# Patient Record
Sex: FEMALE | Race: OTHER | Employment: FULL TIME | URBAN - METROPOLITAN AREA
[De-identification: names, ages, dates, MRNs, and addresses within clinical notes are randomized per-mention and may not be internally consistent; named-entity substitution may affect disease eponyms.]

---

## 2018-05-25 ENCOUNTER — OFFICE VISIT (OUTPATIENT)
Dept: INTERNAL MEDICINE CLINIC | Facility: CLINIC | Age: 53
End: 2018-05-25

## 2018-05-25 VITALS
BODY MASS INDEX: 33.23 KG/M2 | TEMPERATURE: 98 F | SYSTOLIC BLOOD PRESSURE: 113 MMHG | HEART RATE: 67 BPM | DIASTOLIC BLOOD PRESSURE: 73 MMHG | HEIGHT: 61 IN | WEIGHT: 176 LBS

## 2018-05-25 DIAGNOSIS — J01.90 ACUTE NON-RECURRENT SINUSITIS, UNSPECIFIED LOCATION: Primary | ICD-10-CM

## 2018-05-25 DIAGNOSIS — J30.89 NON-SEASONAL ALLERGIC RHINITIS DUE TO OTHER ALLERGIC TRIGGER: ICD-10-CM

## 2018-05-25 DIAGNOSIS — Z91.09 ENVIRONMENTAL ALLERGIES: ICD-10-CM

## 2018-05-25 PROCEDURE — 99214 OFFICE O/P EST MOD 30 MIN: CPT | Performed by: INTERNAL MEDICINE

## 2018-05-25 PROCEDURE — 99212 OFFICE O/P EST SF 10 MIN: CPT | Performed by: INTERNAL MEDICINE

## 2018-05-25 RX ORDER — AMOXICILLIN 875 MG/1
875 TABLET, COATED ORAL 2 TIMES DAILY
Qty: 20 TABLET | Refills: 0 | Status: SHIPPED | OUTPATIENT
Start: 2018-05-25 | End: 2018-06-04

## 2018-05-25 RX ORDER — FLUTICASONE PROPIONATE 50 MCG
2 SPRAY, SUSPENSION (ML) NASAL DAILY
Qty: 3 BOTTLE | Refills: 3 | Status: SHIPPED | OUTPATIENT
Start: 2018-05-25 | End: 2018-12-04

## 2018-05-25 RX ORDER — METHYLPREDNISOLONE 4 MG/1
TABLET ORAL
Qty: 1 KIT | Refills: 0 | Status: SHIPPED | OUTPATIENT
Start: 2018-05-25 | End: 2019-02-12 | Stop reason: ALTCHOICE

## 2018-05-25 RX ORDER — ERGOCALCIFEROL (VITAMIN D2) 1250 MCG
50000 CAPSULE ORAL WEEKLY
Qty: 12 CAPSULE | Refills: 0 | Status: SHIPPED | OUTPATIENT
Start: 2018-05-25 | End: 2018-08-23

## 2018-05-25 NOTE — PROGRESS NOTES
HPI:    Patient ID: Trevor Tello is a 47year old female.     HPI    Seasonal allergy  Started Monday night  Sore throat  Chills    Sinus cold  No wheezing  Post nasdal drip    /73 (BP Location: Right arm, Patient Position: Sitting, Cuff Size: adult Oral Tablet Therapy Pack As directed. Disp: 1 kit Rfl: 0   amoxicillin 875 MG Oral Tab Take 1 tablet (875 mg total) by mouth 2 (two) times daily.  Disp: 20 tablet Rfl: 0   ergocalciferol 51661 units Oral Cap Take 1 capsule (50,000 Units total) by mouth once eye exhibits no discharge. No scleral icterus. Neck: Neck supple. No thyromegaly present. Cardiovascular: Normal rate, regular rhythm, S1 normal, S2 normal, normal heart sounds and intact distal pulses. Exam reveals no gallop. No murmur heard.   Pul

## 2018-12-04 ENCOUNTER — OFFICE VISIT (OUTPATIENT)
Dept: INTERNAL MEDICINE CLINIC | Facility: CLINIC | Age: 53
End: 2018-12-04

## 2018-12-04 VITALS
HEART RATE: 76 BPM | SYSTOLIC BLOOD PRESSURE: 131 MMHG | BODY MASS INDEX: 33.13 KG/M2 | HEIGHT: 62 IN | WEIGHT: 180 LBS | DIASTOLIC BLOOD PRESSURE: 85 MMHG | TEMPERATURE: 98 F

## 2018-12-04 DIAGNOSIS — R05.9 COUGH: Primary | ICD-10-CM

## 2018-12-04 DIAGNOSIS — J06.9 UPPER RESPIRATORY INFECTION, VIRAL: ICD-10-CM

## 2018-12-04 PROCEDURE — 99212 OFFICE O/P EST SF 10 MIN: CPT | Performed by: INTERNAL MEDICINE

## 2018-12-04 PROCEDURE — 99213 OFFICE O/P EST LOW 20 MIN: CPT | Performed by: INTERNAL MEDICINE

## 2018-12-04 RX ORDER — CODEINE PHOSPHATE AND GUAIFENESIN 10; 100 MG/5ML; MG/5ML
5 SOLUTION ORAL EVERY 6 HOURS PRN
Qty: 120 ML | Refills: 0 | Status: SHIPPED | OUTPATIENT
Start: 2018-12-04 | End: 2019-02-12 | Stop reason: ALTCHOICE

## 2018-12-04 RX ORDER — FLUTICASONE PROPIONATE 50 MCG
2 SPRAY, SUSPENSION (ML) NASAL DAILY
Qty: 1 BOTTLE | Refills: 3 | Status: SHIPPED | OUTPATIENT
Start: 2018-12-04 | End: 2019-02-12 | Stop reason: ALTCHOICE

## 2018-12-04 NOTE — PATIENT INSTRUCTIONS
Preventing Common Respiratory Infections  Respiratory infections such as colds and influenza (“the flu”) are common in winter. These infections are often caused by viruses. They may share some symptoms, but not all respiratory infections are the same.  So A flu vaccine protects you from influenza (but not other colds or infections). Get a vaccine each fall, before flu season starts. This can be done at a clinic, healthcare provider’s office, drugstore, senior center, or through your workplace.   Get pneumoco You have a viral upper respiratory illness (URI), which is another term for the common cold. This illness is contagious during the first few days. It is spread through the air by coughing and sneezing.  It may also be spread by direct contact (touching the · Cough with lots of colored sputum (mucus)  · Severe headache; face, neck, or ear pain  · Difficulty swallowing due to throat pain  · Fever of 100.4°F (38°C) or higher, or as directed by your healthcare provider  Call 911  Call 911 if any of these occur: · You may use acetaminophen or ibuprofen to control pain and fever, unless another medicine was prescribed. If you have chronic liver or kidney disease, have ever had a stomach ulcer or gastrointestinal bleeding, or are taking blood-thinning medicines, kaley

## 2018-12-04 NOTE — PROGRESS NOTES
HPI:    Patient ID: Vladimir Lugo is a 47year old female. Thanksgiving around her sister who was sick. Last Tuesday 11/27/18, sore throat x3 days followed by general malaise, body aches, congestion, nasal drip.   Last 3 days severe cough, copious mucou 01/25/2021    Labs reviewed. Imaging Results last 14 days( if any):         Review of Systems:     Constitutional: Negative for chills and fever. HENT: Positive for congestion, postnasal drip and sinus pressure.  Negative for ear pain, rhinorrhea and exhibits no distension. There is no tenderness. Lymphadenopathy:        Head (right side): No submental, no submandibular, no preauricular, no posterior auricular and no occipital adenopathy present.         Head (left side): No submental, no submandibula improve or worsen.      Eddi Conway MD

## 2019-02-12 ENCOUNTER — OFFICE VISIT (OUTPATIENT)
Dept: INTERNAL MEDICINE CLINIC | Facility: CLINIC | Age: 54
End: 2019-02-12

## 2019-02-12 VITALS
TEMPERATURE: 98 F | DIASTOLIC BLOOD PRESSURE: 74 MMHG | HEART RATE: 75 BPM | SYSTOLIC BLOOD PRESSURE: 122 MMHG | BODY MASS INDEX: 34.04 KG/M2 | WEIGHT: 185 LBS | HEIGHT: 62 IN

## 2019-02-12 DIAGNOSIS — M25.511 ACUTE PAIN OF RIGHT SHOULDER: Primary | ICD-10-CM

## 2019-02-12 PROCEDURE — 99212 OFFICE O/P EST SF 10 MIN: CPT | Performed by: INTERNAL MEDICINE

## 2019-02-12 PROCEDURE — 99213 OFFICE O/P EST LOW 20 MIN: CPT | Performed by: INTERNAL MEDICINE

## 2019-02-12 RX ORDER — METHYLPREDNISOLONE 4 MG/1
TABLET ORAL
Qty: 1 KIT | Refills: 0 | Status: SHIPPED | OUTPATIENT
Start: 2019-02-12 | End: 2019-05-06

## 2019-02-12 NOTE — PROGRESS NOTES
Patient ID: Brooks Mcwilliams is a 46year old female. Patient presents with:  Shoulder Pain: extreme right shoulder pain, started last night. HISTORY OF PRESENT ILLNESS:   HPI  Patient resents for above.   Here with a 24 history of extreme right shou insecurity - worry: Not on file      Food insecurity - inability: Not on file      Transportation needs - medical: Not on file      Transportation needs - non-medical: Not on file    Occupational History      Not on file    Tobacco Use      Smoking status: Therapy Pack; As directed. Dispense: 1 kit; Refill: 0  · ORTHOPEDIC - INTERNAL    Return in about 1 week (around 2/19/2019).     Tilford Boas, MD  2/12/2019

## 2019-02-13 ENCOUNTER — HOSPITAL ENCOUNTER (OUTPATIENT)
Dept: GENERAL RADIOLOGY | Facility: HOSPITAL | Age: 54
Discharge: HOME | End: 2019-02-13
Attending: INTERNAL MEDICINE
Payer: COMMERCIAL

## 2019-02-13 DIAGNOSIS — M25.511 ACUTE PAIN OF RIGHT SHOULDER: ICD-10-CM

## 2019-02-13 PROCEDURE — 72050 X-RAY EXAM NECK SPINE 4/5VWS: CPT | Performed by: INTERNAL MEDICINE

## 2019-02-13 PROCEDURE — 73030 X-RAY EXAM OF SHOULDER: CPT | Performed by: INTERNAL MEDICINE

## 2019-02-15 ENCOUNTER — TELEPHONE (OUTPATIENT)
Dept: OTHER | Age: 54
End: 2019-02-15

## 2019-02-15 NOTE — TELEPHONE ENCOUNTER
Verified name and .   Results/recommendations reviewed with pt and pt verb understanding      [id number]    Result Notes for XR CERVICAL SPINE (4VIEWS) (CPT=72050)     Notes recorded by Moira Preciado RN on 2/15/2019 at 11:30 AM [address] [place] [address] to triage

## 2019-02-18 ENCOUNTER — TELEPHONE (OUTPATIENT)
Dept: INTERNAL MEDICINE CLINIC | Facility: CLINIC | Age: 54
End: 2019-02-18

## 2019-02-20 ENCOUNTER — OFFICE VISIT (OUTPATIENT)
Dept: ORTHOPEDICS CLINIC | Facility: CLINIC | Age: 54
End: 2019-02-20

## 2019-02-20 DIAGNOSIS — M75.01 ADHESIVE CAPSULITIS OF RIGHT SHOULDER: Primary | ICD-10-CM

## 2019-02-20 PROCEDURE — 99212 OFFICE O/P EST SF 10 MIN: CPT | Performed by: ORTHOPAEDIC SURGERY

## 2019-02-20 PROCEDURE — 99243 OFF/OP CNSLTJ NEW/EST LOW 30: CPT | Performed by: ORTHOPAEDIC SURGERY

## 2019-02-20 RX ORDER — MELOXICAM 15 MG/1
15 TABLET ORAL DAILY
Qty: 30 TABLET | Refills: 1 | Status: SHIPPED | OUTPATIENT
Start: 2019-02-20 | End: 2019-05-06

## 2019-02-20 NOTE — H&P
Chief Complaint: right shoulder pain    NURSING INTAKE COMMENTS: Patient presents with:  Consult: Right shoulder pain-  pt states pain started last wk. pt denies any injury. pt saw pcp and had XRs and was given medrol pack and it helped.  pain radiates down WNL WNL   Atrophy None None   Bruising None None   Strength        Supraspinatus 5/5 5/5      Infraspinatus 5/5 5/5      Teres Minor 5/5 5/5      Subscapularis 5/5 5/5      Deltoid 5/5 5/5      Biceps 5/5 5/5        Pain         Rotator Cuff resistance non

## 2019-05-06 ENCOUNTER — OFFICE VISIT (OUTPATIENT)
Dept: INTERNAL MEDICINE CLINIC | Facility: CLINIC | Age: 54
End: 2019-05-06

## 2019-05-06 VITALS
TEMPERATURE: 98 F | HEART RATE: 64 BPM | SYSTOLIC BLOOD PRESSURE: 130 MMHG | WEIGHT: 179.38 LBS | DIASTOLIC BLOOD PRESSURE: 83 MMHG | OXYGEN SATURATION: 96 % | BODY MASS INDEX: 33 KG/M2

## 2019-05-06 DIAGNOSIS — R05.9 COUGH: Primary | ICD-10-CM

## 2019-05-06 PROCEDURE — 99213 OFFICE O/P EST LOW 20 MIN: CPT | Performed by: PHYSICIAN ASSISTANT

## 2019-05-06 PROCEDURE — 99212 OFFICE O/P EST SF 10 MIN: CPT | Performed by: PHYSICIAN ASSISTANT

## 2019-05-06 RX ORDER — LEVOCETIRIZINE DIHYDROCHLORIDE 5 MG/1
5 TABLET, FILM COATED ORAL EVERY EVENING
Qty: 30 TABLET | Refills: 2 | Status: SHIPPED | OUTPATIENT
Start: 2019-05-06 | End: 2020-03-12

## 2019-05-06 NOTE — PROGRESS NOTES
HPI:    Patient ID: Navi Andrews is a 46year old female. HPI   Patient presents complaining of a nonproductive cough since last Tuesday. She also complains of congestion, watery eyes and a self-reported intermittent fever.   Patient has taken over-th per NG   • Lung Disorder Mother         Sarcoidosis; per NG   • Asthma Son         per NG   • Allergies Other         Family H/o; per NG   • Glaucoma Neg    • Macular degeneration Neg          PHYSICAL EXAM:   /83 (BP Location: Right arm, Patient Pos Inhalation Aerosol Powder, Breath Activated 1 each 1     Sig: Inhale 2 puffs into the lungs every 4 (four) hours as needed. • Levocetirizine Dihydrochloride 5 MG Oral Tab 30 tablet 2     Sig: Take 1 tablet (5 mg total) by mouth every evening.        Iza Lawton

## 2019-05-14 ENCOUNTER — TELEPHONE (OUTPATIENT)
Dept: CASE MANAGEMENT | Age: 54
End: 2019-05-14

## 2020-03-12 ENCOUNTER — LAB ENCOUNTER (OUTPATIENT)
Dept: LAB | Age: 55
End: 2020-03-12
Attending: INTERNAL MEDICINE
Payer: COMMERCIAL

## 2020-03-12 ENCOUNTER — TELEPHONE (OUTPATIENT)
Dept: OPHTHALMOLOGY | Facility: CLINIC | Age: 55
End: 2020-03-12

## 2020-03-12 ENCOUNTER — OFFICE VISIT (OUTPATIENT)
Dept: INTERNAL MEDICINE CLINIC | Facility: CLINIC | Age: 55
End: 2020-03-12

## 2020-03-12 VITALS
RESPIRATION RATE: 16 BRPM | HEIGHT: 62 IN | BODY MASS INDEX: 34.78 KG/M2 | HEART RATE: 67 BPM | WEIGHT: 189 LBS | SYSTOLIC BLOOD PRESSURE: 120 MMHG | DIASTOLIC BLOOD PRESSURE: 79 MMHG

## 2020-03-12 DIAGNOSIS — Z12.31 SCREENING MAMMOGRAM, ENCOUNTER FOR: ICD-10-CM

## 2020-03-12 DIAGNOSIS — H43.393 VITREOUS FLOATERS OF BOTH EYES: ICD-10-CM

## 2020-03-12 DIAGNOSIS — E55.9 VITAMIN D DEFICIENCY: ICD-10-CM

## 2020-03-12 DIAGNOSIS — H43.813 VITREOUS DETACHMENT, BILATERAL: ICD-10-CM

## 2020-03-12 DIAGNOSIS — Z00.00 PHYSICAL EXAM: ICD-10-CM

## 2020-03-12 DIAGNOSIS — Z00.00 PHYSICAL EXAM: Primary | ICD-10-CM

## 2020-03-12 DIAGNOSIS — Z01.419 ENCOUNTER FOR ROUTINE GYNECOLOGICAL EXAMINATION WITH PAPANICOLAOU SMEAR OF CERVIX: ICD-10-CM

## 2020-03-12 LAB
ALBUMIN SERPL-MCNC: 4.1 G/DL (ref 3.4–5)
ALBUMIN/GLOB SERPL: 1.1 {RATIO} (ref 1–2)
ALP LIVER SERPL-CCNC: 97 U/L (ref 41–108)
ALT SERPL-CCNC: 25 U/L (ref 13–56)
ANION GAP SERPL CALC-SCNC: 6 MMOL/L (ref 0–18)
AST SERPL-CCNC: 16 U/L (ref 15–37)
BASOPHILS # BLD AUTO: 0.05 X10(3) UL (ref 0–0.2)
BASOPHILS NFR BLD AUTO: 1 %
BILIRUB SERPL-MCNC: 0.4 MG/DL (ref 0.1–2)
BUN BLD-MCNC: 13 MG/DL (ref 7–18)
BUN/CREAT SERPL: 20 (ref 10–20)
CALCIUM BLD-MCNC: 9.5 MG/DL (ref 8.5–10.1)
CHLORIDE SERPL-SCNC: 106 MMOL/L (ref 98–112)
CHOLEST SMN-MCNC: 205 MG/DL (ref ?–200)
CO2 SERPL-SCNC: 28 MMOL/L (ref 21–32)
CREAT BLD-MCNC: 0.65 MG/DL (ref 0.55–1.02)
DEPRECATED RDW RBC AUTO: 46.4 FL (ref 35.1–46.3)
EOSINOPHIL # BLD AUTO: 0.07 X10(3) UL (ref 0–0.7)
EOSINOPHIL NFR BLD AUTO: 1.3 %
ERYTHROCYTE [DISTWIDTH] IN BLOOD BY AUTOMATED COUNT: 13.1 % (ref 11–15)
GLOBULIN PLAS-MCNC: 3.8 G/DL (ref 2.8–4.4)
GLUCOSE BLD-MCNC: 103 MG/DL (ref 70–99)
HCT VFR BLD AUTO: 40.6 % (ref 35–48)
HDLC SERPL-MCNC: 57 MG/DL (ref 40–59)
HGB BLD-MCNC: 13.3 G/DL (ref 12–16)
IMM GRANULOCYTES # BLD AUTO: 0.02 X10(3) UL (ref 0–1)
IMM GRANULOCYTES NFR BLD: 0.4 %
LDLC SERPL CALC-MCNC: 126 MG/DL (ref ?–100)
LYMPHOCYTES # BLD AUTO: 1.38 X10(3) UL (ref 1–4)
LYMPHOCYTES NFR BLD AUTO: 26.5 %
M PROTEIN MFR SERPL ELPH: 7.9 G/DL (ref 6.4–8.2)
MCH RBC QN AUTO: 32 PG (ref 26–34)
MCHC RBC AUTO-ENTMCNC: 32.8 G/DL (ref 31–37)
MCV RBC AUTO: 97.8 FL (ref 80–100)
MONOCYTES # BLD AUTO: 0.45 X10(3) UL (ref 0.1–1)
MONOCYTES NFR BLD AUTO: 8.6 %
NEUTROPHILS # BLD AUTO: 3.24 X10 (3) UL (ref 1.5–7.7)
NEUTROPHILS # BLD AUTO: 3.24 X10(3) UL (ref 1.5–7.7)
NEUTROPHILS NFR BLD AUTO: 62.2 %
NONHDLC SERPL-MCNC: 148 MG/DL (ref ?–130)
OSMOLALITY SERPL CALC.SUM OF ELEC: 290 MOSM/KG (ref 275–295)
PATIENT FASTING Y/N/NP: YES
PATIENT FASTING Y/N/NP: YES
PLATELET # BLD AUTO: 242 10(3)UL (ref 150–450)
POTASSIUM SERPL-SCNC: 4.3 MMOL/L (ref 3.5–5.1)
RBC # BLD AUTO: 4.15 X10(6)UL (ref 3.8–5.3)
SODIUM SERPL-SCNC: 140 MMOL/L (ref 136–145)
TRIGL SERPL-MCNC: 108 MG/DL (ref 30–149)
TSI SER-ACNC: 1.69 MIU/ML (ref 0.36–3.74)
VLDLC SERPL CALC-MCNC: 22 MG/DL (ref 0–30)
WBC # BLD AUTO: 5.2 X10(3) UL (ref 4–11)

## 2020-03-12 PROCEDURE — 82306 VITAMIN D 25 HYDROXY: CPT

## 2020-03-12 PROCEDURE — 80053 COMPREHEN METABOLIC PANEL: CPT

## 2020-03-12 PROCEDURE — 80061 LIPID PANEL: CPT

## 2020-03-12 PROCEDURE — 85025 COMPLETE CBC W/AUTO DIFF WBC: CPT

## 2020-03-12 PROCEDURE — 99396 PREV VISIT EST AGE 40-64: CPT | Performed by: INTERNAL MEDICINE

## 2020-03-12 PROCEDURE — 36415 COLL VENOUS BLD VENIPUNCTURE: CPT

## 2020-03-12 PROCEDURE — 84443 ASSAY THYROID STIM HORMONE: CPT

## 2020-03-12 NOTE — PROGRESS NOTES
Dennys Rasmussen is a 53year old female.   Patient presents with:  Physical: NO flu shot  Eye Problem: Left eye   Pap      HPI:   Patient comes for physical  C/C physical  C/o complains of left eye pain one week-- has had floaters chronic but gets flaoters o apparent distress  SKIN: no rashes,no suspicious lesions  HEENT: atraumatic, normocephalic,ears and throat are clear, no frontal or maxillary sinus tenderness, pupils equal reactive to light bilaterally, extraocular muscles intact  NECK: supple,no adenopat 2015–normal  Colonoscopy–at the age of 47, rpt in 11 yrs -- ie 2021   Mammogram–2015  Labs to be done today as she is fasting  Declines flu shot           The patient indicates understanding of these issues and agrees to the plan. No follow-ups on file.

## 2020-03-12 NOTE — TELEPHONE ENCOUNTER
Pt states floaters are getting larger  and its obstructing her vision referred by PCP  please advise

## 2020-03-12 NOTE — TELEPHONE ENCOUNTER
Pt complains of floaters in both eyes and one episode of eye pain left eye last week. No flashing lights, no vision loss. NP/ I gave her information to see [facility].

## 2020-03-13 LAB — 25(OH)D3 SERPL-MCNC: 13.4 NG/ML (ref 30–100)

## 2020-03-15 RX ORDER — ERGOCALCIFEROL 1.25 MG/1
50000 CAPSULE ORAL WEEKLY
Qty: 4 CAPSULE | Refills: 3 | Status: SHIPPED | OUTPATIENT
Start: 2020-03-15

## 2020-03-18 ENCOUNTER — TELEPHONE (OUTPATIENT)
Dept: INTERNAL MEDICINE CLINIC | Facility: CLINIC | Age: 55
End: 2020-03-18

## 2020-03-18 NOTE — TELEPHONE ENCOUNTER
Patient requesting to have referral for Dr. Dipesh Odell (Ophthalmologist) faxed to his office at:  [phone]. Please fax.

## 2020-08-17 ENCOUNTER — TELEMEDICINE (OUTPATIENT)
Dept: INTERNAL MEDICINE CLINIC | Facility: CLINIC | Age: 55
End: 2020-08-17

## 2020-08-17 ENCOUNTER — NURSE TRIAGE (OUTPATIENT)
Dept: INTERNAL MEDICINE CLINIC | Facility: CLINIC | Age: 55
End: 2020-08-17

## 2020-08-17 DIAGNOSIS — S93.401A SPRAIN OF RIGHT ANKLE, UNSPECIFIED LIGAMENT, INITIAL ENCOUNTER: Primary | ICD-10-CM

## 2020-08-17 PROCEDURE — 99213 OFFICE O/P EST LOW 20 MIN: CPT | Performed by: INTERNAL MEDICINE

## 2020-08-17 NOTE — PROGRESS NOTES
Patient ID: Jorge Barbosa is a 53year old female. Patient presents with: Foot Pain         HISTORY OF PRESENT ILLNESS:   Patient presents for above. This visit is conducted using Telemedicine with live, interactive video and audio.   C/c sprained her a History      Not on file    Social Needs      Financial resource strain: Not on file      Food insecurity:        Worry: Not on file        Inability: Not on file      Transportation needs:        Medical: Not on file        Non-medical: Not on file    Tob distress, no coughing  She did show the right ankle and it is swollen but without any redness. ASSESSMENT/PLAN:   1. Sprain of right ankle, unspecified ligament, initial encounter  - XR ANKLE (MIN 3 VIEWS), RIGHT (CPT=73610);  Future  Advised to ice the the patient confirmed that [place] were in [place]. Included in this visit, time may have been spent reviewing labs, medications, radiology tests and decision making.  Appropriate medical decision-making and tests are ordered as detailed in the plan of care

## 2020-08-17 NOTE — TELEPHONE ENCOUNTER
Action Requested: Summary for Provider     []  Critical Lab, Recommendations Needed  [] Need Additional Advice  []   FYI    []   Need Orders  [] Need Medications Sent to Pharmacy  []  Other     SUMMARY:    Virtual appointment made for today 8/17/20 -  [place]

## 2020-08-18 ENCOUNTER — HOSPITAL ENCOUNTER (OUTPATIENT)
Dept: GENERAL RADIOLOGY | Facility: HOSPITAL | Age: 55
Discharge: HOME | End: 2020-08-18
Attending: INTERNAL MEDICINE
Payer: COMMERCIAL

## 2020-08-18 DIAGNOSIS — S93.401A SPRAIN OF RIGHT ANKLE, UNSPECIFIED LIGAMENT, INITIAL ENCOUNTER: ICD-10-CM

## 2020-08-18 PROCEDURE — 73610 X-RAY EXAM OF ANKLE: CPT | Performed by: INTERNAL MEDICINE

## 2020-08-19 DIAGNOSIS — S82.891A CLOSED FRACTURE OF RIGHT ANKLE, INITIAL ENCOUNTER: Primary | ICD-10-CM

## 2020-08-24 ENCOUNTER — OFFICE VISIT (OUTPATIENT)
Dept: ORTHOPEDICS CLINIC | Facility: CLINIC | Age: 55
End: 2020-08-24

## 2020-08-24 VITALS — BODY MASS INDEX: 34.78 KG/M2 | HEIGHT: 62 IN | WEIGHT: 189 LBS

## 2020-08-24 DIAGNOSIS — S92.251A CLOSED AVULSION FRACTURE OF NAVICULAR BONE OF RIGHT FOOT, INITIAL ENCOUNTER: ICD-10-CM

## 2020-08-24 DIAGNOSIS — S92.154A CLOSED NONDISPLACED AVULSION FRACTURE OF RIGHT TALUS, INITIAL ENCOUNTER: Primary | ICD-10-CM

## 2020-08-24 PROCEDURE — 28430 CLTX TALUS FRACTURE W/O MNPJ: CPT | Performed by: ORTHOPAEDIC SURGERY

## 2020-08-24 PROCEDURE — 3008F BODY MASS INDEX DOCD: CPT | Performed by: ORTHOPAEDIC SURGERY

## 2020-08-24 PROCEDURE — 99243 OFF/OP CNSLTJ NEW/EST LOW 30: CPT | Performed by: ORTHOPAEDIC SURGERY

## 2020-08-24 RX ORDER — LEVOCETIRIZINE DIHYDROCHLORIDE 5 MG/1
5 TABLET, FILM COATED ORAL EVERY EVENING
COMMUNITY
Start: 2020-05-27 | End: 2020-10-17

## 2020-08-24 NOTE — H&P
NURSING INTAKE COMMENTS: Patient presents with:  Consult: right ankle injury -- Xrays taken 08/18/20. Onset 08/15/20 from falling down stairs  at home. Rates pain 1-2/10. No pain meds taken now.        HPI: This 53year old female presents today with compla Social History    Occupational History      Not on file    Tobacco Use      Smoking status: Never Smoker      Smokeless tobacco: Never Used    Substance and Sexual Activity      Alcohol use: No        Alcohol/week: 0.0 standard drinks      Drug use: 8/18/2020  PROCEDURE: XR ANKLE (MIN 3 VIEWS), RIGHT (CPT=73610)  COMPARISON: None. INDICATIONS: Pain in right lateral mallelous post fall 8-. TECHNIQUE: Three views were obtained.    FINDINGS:  BONES: Mildly displaced avulsion fractures of the dors any typos.     Natividad Diaz MD

## 2020-10-16 NOTE — TELEPHONE ENCOUNTER
Patient is requesting a refill for levocetirizine dihydrochloride. Please advise.      Levocetirizine Dihydrochloride 5 MG Oral Tab

## 2020-10-17 RX ORDER — LEVOCETIRIZINE DIHYDROCHLORIDE 5 MG/1
5 TABLET, FILM COATED ORAL EVERY EVENING
Qty: 90 TABLET | Refills: 1 | Status: SHIPPED | OUTPATIENT
Start: 2020-10-17

## 2020-12-29 ENCOUNTER — TELEPHONE (OUTPATIENT)
Dept: GASTROENTEROLOGY | Facility: CLINIC | Age: 55
End: 2020-12-29

## 2020-12-29 NOTE — TELEPHONE ENCOUNTER
----- Message from Corby Keating RN sent at 1/29/2016  8:06 AM CST -----  Regarding: colon recall  Repeat colon in 5 years per Dr Jessie Ray

## 2021-06-02 ENCOUNTER — TELEPHONE (OUTPATIENT)
Dept: INTERNAL MEDICINE CLINIC | Facility: CLINIC | Age: 56
End: 2021-06-02

## 2021-06-02 DIAGNOSIS — Z12.31 SCREENING MAMMOGRAM, ENCOUNTER FOR: Primary | ICD-10-CM

## 2021-06-03 NOTE — TELEPHONE ENCOUNTER
Ordered mammogram but please call patient and let her know that she is due for an appointment is we need to discuss blood work and her elevated fasting blood sugars–please ask her to make an appointment

## 2021-06-03 NOTE — TELEPHONE ENCOUNTER
The patient was called and informed. She stated she cannot make the appointment now for she is at work. She will call back to schedule the appointment.

## 2022-05-11 ENCOUNTER — TELEPHONE (OUTPATIENT)
Dept: CASE MANAGEMENT | Age: 57
End: 2022-05-11

## 2023-04-03 ENCOUNTER — OFFICE VISIT (OUTPATIENT)
Dept: INTERNAL MEDICINE CLINIC | Facility: CLINIC | Age: 58
End: 2023-04-03

## 2023-04-03 VITALS
OXYGEN SATURATION: 98 % | RESPIRATION RATE: 16 BRPM | SYSTOLIC BLOOD PRESSURE: 130 MMHG | DIASTOLIC BLOOD PRESSURE: 83 MMHG | BODY MASS INDEX: 34.37 KG/M2 | WEIGHT: 194 LBS | HEART RATE: 92 BPM | TEMPERATURE: 98 F | HEIGHT: 63 IN

## 2023-04-03 DIAGNOSIS — J06.9 URI WITH COUGH AND CONGESTION: Primary | ICD-10-CM

## 2023-04-03 PROCEDURE — 3079F DIAST BP 80-89 MM HG: CPT | Performed by: NURSE PRACTITIONER

## 2023-04-03 PROCEDURE — 99213 OFFICE O/P EST LOW 20 MIN: CPT | Performed by: NURSE PRACTITIONER

## 2023-04-03 PROCEDURE — 3008F BODY MASS INDEX DOCD: CPT | Performed by: NURSE PRACTITIONER

## 2023-04-03 PROCEDURE — 3075F SYST BP GE 130 - 139MM HG: CPT | Performed by: NURSE PRACTITIONER

## 2023-04-03 RX ORDER — CODEINE PHOSPHATE AND GUAIFENESIN 10; 100 MG/5ML; MG/5ML
5 SOLUTION ORAL EVERY 6 HOURS PRN
Qty: 118 ML | Refills: 0 | Status: SHIPPED | OUTPATIENT
Start: 2023-04-03

## 2023-04-03 NOTE — PATIENT INSTRUCTIONS
I will follow up with your regarding your test results     Continue theraflu as needed for cough and congestion    You can take tylenol or ibuprofen as needed for sore throat and body aches.

## 2023-04-04 ENCOUNTER — TELEPHONE (OUTPATIENT)
Dept: INTERNAL MEDICINE CLINIC | Facility: CLINIC | Age: 58
End: 2023-04-04

## 2023-04-04 ENCOUNTER — HOSPITAL ENCOUNTER (OUTPATIENT)
Dept: GENERAL RADIOLOGY | Facility: HOSPITAL | Age: 58
Discharge: HOME | End: 2023-04-04
Attending: NURSE PRACTITIONER
Payer: COMMERCIAL

## 2023-04-04 DIAGNOSIS — J06.9 URI WITH COUGH AND CONGESTION: ICD-10-CM

## 2023-04-04 LAB
FLUAV + FLUBV RNA SPEC NAA+PROBE: NOT DETECTED
FLUAV + FLUBV RNA SPEC NAA+PROBE: NOT DETECTED
RSV RNA SPEC NAA+PROBE: NOT DETECTED
SARS-COV-2 RNA RESP QL NAA+PROBE: NOT DETECTED

## 2023-04-04 PROCEDURE — 71046 X-RAY EXAM CHEST 2 VIEWS: CPT | Performed by: NURSE PRACTITIONER

## 2023-04-11 DIAGNOSIS — J06.9 URI WITH COUGH AND CONGESTION: ICD-10-CM

## 2023-04-11 RX ORDER — CODEINE PHOSPHATE AND GUAIFENESIN 10; 100 MG/5ML; MG/5ML
5 SOLUTION ORAL EVERY 6 HOURS PRN
Qty: 118 ML | Refills: 0 | Status: SHIPPED | OUTPATIENT
Start: 2023-04-11

## 2023-04-11 RX ORDER — METHYLPREDNISOLONE 4 MG/1
TABLET ORAL
Qty: 21 TABLET | Refills: 0 | Status: SHIPPED | OUTPATIENT
Start: 2023-04-11

## 2023-04-11 RX ORDER — FLUTICASONE PROPIONATE 50 MCG
2 SPRAY, SUSPENSION (ML) NASAL DAILY
Qty: 16 G | Refills: 0 | Status: SHIPPED | OUTPATIENT
Start: 2023-04-11 | End: 2024-04-05

## 2023-04-11 NOTE — TELEPHONE ENCOUNTER
Per patient cough mostly at night but sometimes when she starts talking a lot she will have a coughing episode. No other symptoms present currently. Patient states she has had improvement but in the middle of the night she will start coughing excessively and it wakes her up. Denies any wheezing or shortness of breath but patient states she does feel congested. She would also like to know if she can have flonase?

## 2023-04-11 NOTE — TELEPHONE ENCOUNTER
Please contact patient she is only coughing at night time? Is she having any wheezing or SOB. Are her symptoms improving?

## 2023-04-11 NOTE — TELEPHONE ENCOUNTER
Please review. Protocol failed / No protocol. Requested Prescriptions   Pending Prescriptions Disp Refills    guaiFENesin-codeine (CHERATUSSIN AC) 100-10 MG/5ML Oral Solution 118 mL 0     Sig: Take 5 mL by mouth every 6 (six) hours as needed for cough.        There is no refill protocol information for this order              Recent Outpatient Visits              1 week ago URI with cough and congestion    [address] [facility], [facility]    Office Visit    2 years ago Closed nondisplaced avulsion fracture of right talus, initial encounter    [address] [facility], Nelda King MD    Office Visit    2 years ago Sprain of right ankle, unspecified ligament, initial encounter    [address] [facility], Magdiel Soria MD    Telemedicine    3 years ago Physical exam    Vivien Grijalva MD    Office Visit    3 years ago Cough    [facility], [address] [facility]    Office Visit

## 2023-04-20 ENCOUNTER — OFFICE VISIT (OUTPATIENT)
Dept: INTERNAL MEDICINE CLINIC | Facility: CLINIC | Age: 58
End: 2023-04-20

## 2023-04-20 VITALS
BODY MASS INDEX: 34.37 KG/M2 | HEIGHT: 63 IN | WEIGHT: 194 LBS | DIASTOLIC BLOOD PRESSURE: 83 MMHG | SYSTOLIC BLOOD PRESSURE: 129 MMHG | HEART RATE: 73 BPM | RESPIRATION RATE: 16 BRPM

## 2023-04-20 DIAGNOSIS — J02.9 SORE THROAT: Primary | ICD-10-CM

## 2023-04-20 LAB
CONTROL LINE PRESENT WITH A CLEAR BACKGROUND (YES/NO): YES YES/NO
KIT LOT #: NORMAL NUMERIC
STREP GRP A CUL-SCR: NEGATIVE

## 2023-04-20 PROCEDURE — 3074F SYST BP LT 130 MM HG: CPT | Performed by: NURSE PRACTITIONER

## 2023-04-20 PROCEDURE — 87880 STREP A ASSAY W/OPTIC: CPT | Performed by: NURSE PRACTITIONER

## 2023-04-20 PROCEDURE — 3008F BODY MASS INDEX DOCD: CPT | Performed by: NURSE PRACTITIONER

## 2023-04-20 PROCEDURE — 99213 OFFICE O/P EST LOW 20 MIN: CPT | Performed by: NURSE PRACTITIONER

## 2023-04-20 PROCEDURE — 3079F DIAST BP 80-89 MM HG: CPT | Performed by: NURSE PRACTITIONER

## 2023-04-20 RX ORDER — LIDOCAINE HYDROCHLORIDE 20 MG/ML
5 SOLUTION OROPHARYNGEAL
Qty: 100 ML | Refills: 0 | Status: SHIPPED | OUTPATIENT
Start: 2023-04-20

## 2023-04-20 NOTE — PATIENT INSTRUCTIONS
Take ibuprofen as needed for throat pain    Take an over the counter decongestant for ear pressure and throat discomfort. You can take zyrtec-D or sudafed. Continue flonase nasal spray daily. Make a follow up appt with ENT.

## 2023-09-18 ENCOUNTER — LAB ENCOUNTER (OUTPATIENT)
Dept: LAB | Age: 58
End: 2023-09-18
Attending: NURSE PRACTITIONER
Payer: COMMERCIAL

## 2023-09-18 ENCOUNTER — OFFICE VISIT (OUTPATIENT)
Dept: INTERNAL MEDICINE CLINIC | Facility: CLINIC | Age: 58
End: 2023-09-18

## 2023-09-18 VITALS
RESPIRATION RATE: 16 BRPM | DIASTOLIC BLOOD PRESSURE: 84 MMHG | HEIGHT: 63 IN | HEART RATE: 61 BPM | BODY MASS INDEX: 33.66 KG/M2 | WEIGHT: 190 LBS | SYSTOLIC BLOOD PRESSURE: 133 MMHG

## 2023-09-18 DIAGNOSIS — Z00.00 ANNUAL PHYSICAL EXAM: Primary | ICD-10-CM

## 2023-09-18 DIAGNOSIS — E55.9 VITAMIN D DEFICIENCY: ICD-10-CM

## 2023-09-18 DIAGNOSIS — Z12.11 COLON CANCER SCREENING: ICD-10-CM

## 2023-09-18 DIAGNOSIS — J30.89 PERENNIAL ALLERGIC RHINITIS: ICD-10-CM

## 2023-09-18 DIAGNOSIS — L73.8 SEBACEOUS HYPERPLASIA OF FACE: ICD-10-CM

## 2023-09-18 DIAGNOSIS — Z00.00 ANNUAL PHYSICAL EXAM: ICD-10-CM

## 2023-09-18 DIAGNOSIS — Z12.31 ENCOUNTER FOR SCREENING MAMMOGRAM FOR MALIGNANT NEOPLASM OF BREAST: ICD-10-CM

## 2023-09-18 DIAGNOSIS — Z12.4 CERVICAL CANCER SCREENING: ICD-10-CM

## 2023-09-18 DIAGNOSIS — E66.9 OBESITY (BMI 30.0-34.9): ICD-10-CM

## 2023-09-18 LAB
ALBUMIN SERPL-MCNC: 3.8 G/DL (ref 3.4–5)
ALBUMIN/GLOB SERPL: 1 {RATIO} (ref 1–2)
ALP LIVER SERPL-CCNC: 102 U/L
ALT SERPL-CCNC: 26 U/L
ANION GAP SERPL CALC-SCNC: 5 MMOL/L (ref 0–18)
AST SERPL-CCNC: 16 U/L (ref 15–37)
BILIRUB SERPL-MCNC: 0.4 MG/DL (ref 0.1–2)
BUN BLD-MCNC: 11 MG/DL (ref 7–18)
BUN/CREAT SERPL: 16.7 (ref 10–20)
CALCIUM BLD-MCNC: 9.2 MG/DL (ref 8.5–10.1)
CHLORIDE SERPL-SCNC: 109 MMOL/L (ref 98–112)
CHOLEST SERPL-MCNC: 189 MG/DL (ref ?–200)
CO2 SERPL-SCNC: 27 MMOL/L (ref 21–32)
CREAT BLD-MCNC: 0.66 MG/DL
DEPRECATED RDW RBC AUTO: 48.2 FL (ref 35.1–46.3)
EGFRCR SERPLBLD CKD-EPI 2021: 102 ML/MIN/1.73M2 (ref 60–?)
ERYTHROCYTE [DISTWIDTH] IN BLOOD BY AUTOMATED COUNT: 13.4 % (ref 11–15)
EST. AVERAGE GLUCOSE BLD GHB EST-MCNC: 131 MG/DL (ref 68–126)
FASTING PATIENT LIPID ANSWER: YES
FASTING STATUS PATIENT QL REPORTED: YES
GLOBULIN PLAS-MCNC: 3.9 G/DL (ref 2.8–4.4)
GLUCOSE BLD-MCNC: 105 MG/DL (ref 70–99)
HBA1C MFR BLD: 6.2 % (ref ?–5.7)
HCT VFR BLD AUTO: 41.3 %
HDLC SERPL-MCNC: 57 MG/DL (ref 40–59)
HGB BLD-MCNC: 13.2 G/DL
LDLC SERPL CALC-MCNC: 117 MG/DL (ref ?–100)
MCH RBC QN AUTO: 31.1 PG (ref 26–34)
MCHC RBC AUTO-ENTMCNC: 32 G/DL (ref 31–37)
MCV RBC AUTO: 97.2 FL
NONHDLC SERPL-MCNC: 132 MG/DL (ref ?–130)
OSMOLALITY SERPL CALC.SUM OF ELEC: 292 MOSM/KG (ref 275–295)
PLATELET # BLD AUTO: 221 10(3)UL (ref 150–450)
POTASSIUM SERPL-SCNC: 4.2 MMOL/L (ref 3.5–5.1)
PROT SERPL-MCNC: 7.7 G/DL (ref 6.4–8.2)
RBC # BLD AUTO: 4.25 X10(6)UL
SODIUM SERPL-SCNC: 141 MMOL/L (ref 136–145)
TRIGL SERPL-MCNC: 80 MG/DL (ref 30–149)
TSI SER-ACNC: 1.29 MIU/ML (ref 0.36–3.74)
VIT D+METAB SERPL-MCNC: 22 NG/ML (ref 30–100)
VLDLC SERPL CALC-MCNC: 14 MG/DL (ref 0–30)
WBC # BLD AUTO: 5.2 X10(3) UL (ref 4–11)

## 2023-09-18 PROCEDURE — 3008F BODY MASS INDEX DOCD: CPT | Performed by: NURSE PRACTITIONER

## 2023-09-18 PROCEDURE — 36415 COLL VENOUS BLD VENIPUNCTURE: CPT

## 2023-09-18 PROCEDURE — 99396 PREV VISIT EST AGE 40-64: CPT | Performed by: NURSE PRACTITIONER

## 2023-09-18 PROCEDURE — 3075F SYST BP GE 130 - 139MM HG: CPT | Performed by: NURSE PRACTITIONER

## 2023-09-18 PROCEDURE — 80061 LIPID PANEL: CPT

## 2023-09-18 PROCEDURE — 3079F DIAST BP 80-89 MM HG: CPT | Performed by: NURSE PRACTITIONER

## 2023-09-18 PROCEDURE — 80053 COMPREHEN METABOLIC PANEL: CPT

## 2023-09-18 PROCEDURE — 82306 VITAMIN D 25 HYDROXY: CPT

## 2023-09-18 PROCEDURE — 83036 HEMOGLOBIN GLYCOSYLATED A1C: CPT

## 2023-09-18 PROCEDURE — 84443 ASSAY THYROID STIM HORMONE: CPT

## 2023-09-18 PROCEDURE — 85027 COMPLETE CBC AUTOMATED: CPT

## 2023-09-18 RX ORDER — ERGOCALCIFEROL 1.25 MG/1
50000 CAPSULE ORAL
Qty: 12 CAPSULE | Refills: 0 | Status: SHIPPED | OUTPATIENT
Start: 2023-09-18 | End: 2023-12-17

## 2023-09-18 RX ORDER — LEVOCETIRIZINE DIHYDROCHLORIDE 5 MG/1
5 TABLET, FILM COATED ORAL EVERY EVENING
Qty: 30 TABLET | Refills: 2 | Status: SHIPPED | OUTPATIENT
Start: 2023-09-18

## 2023-09-18 RX ORDER — LEVOCETIRIZINE DIHYDROCHLORIDE 5 MG/1
5 TABLET, FILM COATED ORAL EVERY EVENING
COMMUNITY
End: 2023-09-18

## 2023-09-18 RX ORDER — FLUTICASONE PROPIONATE 50 MCG
2 SPRAY, SUSPENSION (ML) NASAL DAILY
Qty: 16 G | Refills: 1 | Status: SHIPPED | OUTPATIENT
Start: 2023-09-18 | End: 2024-09-12

## 2023-10-03 ENCOUNTER — OFFICE VISIT (OUTPATIENT)
Dept: DERMATOLOGY CLINIC | Facility: CLINIC | Age: 58
End: 2023-10-03

## 2023-10-03 DIAGNOSIS — L73.8 SEBACEOUS HYPERPLASIA: Primary | ICD-10-CM

## 2023-10-03 PROCEDURE — 99204 OFFICE O/P NEW MOD 45 MIN: CPT | Performed by: STUDENT IN AN ORGANIZED HEALTH CARE EDUCATION/TRAINING PROGRAM

## 2023-10-03 NOTE — PROGRESS NOTES
October 3, 2023    New patient     CHIEF COMPLAINT: Spots on face    HISTORY OF PRESENT ILLNESS: .    1. Spots on face  Location: Face   Duration: 5 + years  Signs and symptoms: none  Current treatment: none  Past treatments: States her provider tried to \"electronically\" remove them (cauterize?)    DERM HISTORY:  History of skin cancer: No  History of chronic skin disease/condition: No    FAMILY HISTORY:  History of melanoma: No  History of chronic skin disease/condition: No    History/Other:    REVIEW OF SYSTEMS:  Constitutional: Denies fever, chills, unintentional weight loss. Skin as per HPI    PAST MEDICAL HISTORY:  Past Medical History:   Diagnosis Date    Bone spur     Bone spur (left foot); per NG    Hallux valgus of left foot 2010    per NG    Multiple allergies     per NG    PVD (posterior vitreous detachment), right eye     OD; per NG    Vitreous floaters of left eye     OS; per NG       Medications  Current Outpatient Medications   Medication Sig Dispense Refill    levocetirizine 5 MG Oral Tab Take 1 tablet (5 mg total) by mouth every evening. 30 tablet 2    fluticasone propionate 50 MCG/ACT Nasal Suspension 2 sprays by Each Nare route daily. 16 g 1    ergocalciferol 1.25 MG (88899 UT) Oral Cap Take 1 capsule (50,000 Units total) by mouth every 7 days. 12 capsule 0       Objective:    PHYSICAL EXAM:  General: awake, alert, no acute distress  Skin: Skin exam was performed today including the following: face. Pertinent findings include:   - with numerous pink-yellow umbilicated papules with  vessels    ASSESSMENT & PLAN:  Pathophysiology of diagnoses discussed with patient. Therapeutic options reviewed. Risks, benefits, and alternatives discussed with patient. Instructions reviewed at length. #Sebaceous hyperplasia  - The benign nature of these lesions was discussed with the patient and no treatment is indicated today. - Tretinoin 0.025% cream to be applied in the evening. Patient counseled to apply a thin layer to the entire face starting off 2-3 nights per week and then gradually increasing the frequency to every night as tolerated. Side effects, including skin dryness, redness, and irritation, were reviewed. Patient was notified that if prescribed for cosmetic purposes, this prescription is not likely to be covered by insurance. Discussed that topical retinoids should be avoided during pregnancy. - Cautery, low, blunt tip at 2.2 to lesions on face - N/C  Risks (including, but not limited to scarring, pain, bleeding, infection, dyschromia) benefits, alternatives and personnel discussed with patient who consents to proceed. Vaseline applied after    The note was routed electronically to the referring physician.     Lisy Serrano MD

## 2024-02-14 ENCOUNTER — TELEPHONE (OUTPATIENT)
Dept: DERMATOLOGY CLINIC | Facility: CLINIC | Age: 59
End: 2024-02-14

## 2024-05-01 ENCOUNTER — TELEPHONE (OUTPATIENT)
Dept: INTERNAL MEDICINE CLINIC | Facility: CLINIC | Age: 59
End: 2024-05-01

## 2024-05-01 ENCOUNTER — OFFICE VISIT (OUTPATIENT)
Dept: INTERNAL MEDICINE CLINIC | Facility: CLINIC | Age: 59
End: 2024-05-01
Payer: COMMERCIAL

## 2024-05-01 VITALS
HEIGHT: 63 IN | OXYGEN SATURATION: 96 % | BODY MASS INDEX: 33.66 KG/M2 | WEIGHT: 190 LBS | SYSTOLIC BLOOD PRESSURE: 114 MMHG | HEART RATE: 65 BPM | DIASTOLIC BLOOD PRESSURE: 78 MMHG

## 2024-05-01 DIAGNOSIS — L73.8 SEBACEOUS HYPERPLASIA OF FACE: ICD-10-CM

## 2024-05-01 DIAGNOSIS — R35.0 URINARY FREQUENCY: Primary | ICD-10-CM

## 2024-05-01 LAB
BILIRUBIN: NEGATIVE
GLUCOSE (URINE DIPSTICK): NEGATIVE MG/DL
LEUKOCYTES: NEGATIVE
MULTISTIX LOT#: ABNORMAL NUMERIC
NITRITE, URINE: NEGATIVE
PH, URINE: 6.5 (ref 4.5–8)
SPECIFIC GRAVITY: 1.02 (ref 1–1.03)
UROBILINOGEN,SEMI-QN: 0.2 MG/DL (ref 0–1.9)

## 2024-05-01 PROCEDURE — 99213 OFFICE O/P EST LOW 20 MIN: CPT

## 2024-05-01 PROCEDURE — 3074F SYST BP LT 130 MM HG: CPT

## 2024-05-01 PROCEDURE — 3078F DIAST BP <80 MM HG: CPT

## 2024-05-01 PROCEDURE — 81002 URINALYSIS NONAUTO W/O SCOPE: CPT

## 2024-05-01 PROCEDURE — 3008F BODY MASS INDEX DOCD: CPT

## 2024-05-01 RX ORDER — SULFAMETHOXAZOLE AND TRIMETHOPRIM 800; 160 MG/1; MG/1
1 TABLET ORAL 2 TIMES DAILY
Qty: 6 TABLET | Refills: 0 | Status: SHIPPED | OUTPATIENT
Start: 2024-05-01 | End: 2024-05-04

## 2024-05-01 NOTE — TELEPHONE ENCOUNTER
Irene regalado from [facility] pharmacy  states the insurance needs to know the amount of cream in grams for each application     Pharmacist estimates  \" pea size  \" to be  2 grams per application and 45 gram supply should last about 25 days     .SRAVAN Gold -- Do you agree with application amount ? Do you need to incrrase the amount of grams dispensed?  Medication Quantity Refills Start End   tretinoin 0.025 % External Cream 45 g 0 5/1/2024 --   Sig:   Apply pea sized amount to the full face at night, making sure to avoid the eyes and lips. Wash off in the morning. Start using every other night and then progress to every night as tolerated. Apply moisturizer nightly to avoid excessive drying         Please advise and thank you.

## 2024-05-01 NOTE — PROGRESS NOTES
Subjective:   Sera Walsh is a 58 year old female who presents for UTI (Has been feeling urinary frequency, pressure in her lower abdomen)     Symptoms started last night around 8:15pm   Urinary frequency and pressure in the lower abdomen  No blood or foul odor in the urine  No fever or chills or nausea   Has not slept well due to the symptoms, was up and down to the bathroom all night    She did take a leftover Bactrim from her son - one dose last night which helped her sleep   Took an OTC pain medication as well   No history of UTIs    Requesting a refill of a cream dermatology sent for her face, would also like to follow up with him    History/Other:    Chief Complaint Reviewed and Verified  Nursing Notes Reviewed and   Verified  Tobacco Reviewed  Allergies Reviewed  Medications Reviewed    Problem List Reviewed  Medical History Reviewed  Surgical History   Reviewed  OB Status Reviewed  Family History Reviewed         Tobacco:  She has never smoked tobacco.    Current Outpatient Medications   Medication Sig Dispense Refill    sulfamethoxazole-trimethoprim -160 MG Oral Tab per tablet Take 1 tablet by mouth 2 (two) times daily for 3 days. 6 tablet 0    tretinoin 0.025 % External Cream Apply pea sized amount to the full face at night, making sure to avoid the eyes and lips. Wash off in the morning. Start using every other night and then progress to every night as tolerated. Apply moisturizer nightly to avoid excessive drying 45 g 0    fluticasone propionate 50 MCG/ACT Nasal Suspension 2 sprays by Each Nare route daily. 16 g 1    levocetirizine 5 MG Oral Tab Take 1 tablet (5 mg total) by mouth every evening. (Patient not taking: Reported on 5/1/2024) 30 tablet 2         Review of Systems:  Review of Systems   Constitutional: Negative.    Respiratory: Negative.     Cardiovascular: Negative.    Gastrointestinal: Negative.    Genitourinary:  Positive for frequency and pelvic pain. Negative for  dysuria, flank pain and hematuria.   Skin: Negative.    Neurological: Negative.          Objective:   /78   Pulse 65   Ht 5' 3\" (1.6 m)   Wt 190 lb (86.2 kg)   LMP 01/28/2019 (Exact Date)   SpO2 96%   BMI 33.66 kg/m²  Estimated body mass index is 33.66 kg/m² as calculated from the following:    Height as of this encounter: 5' 3\" (1.6 m).    Weight as of this encounter: 190 lb (86.2 kg).  Physical Exam  Vitals reviewed.   Constitutional:       General: She is not in acute distress.     Appearance: Normal appearance. She is well-developed.   Cardiovascular:      Rate and Rhythm: Normal rate and regular rhythm.      Heart sounds: Normal heart sounds.   Pulmonary:      Effort: Pulmonary effort is normal.      Breath sounds: Normal breath sounds.   Abdominal:      General: Bowel sounds are normal.      Palpations: Abdomen is soft.      Tenderness: There is no right CVA tenderness or left CVA tenderness.   Skin:     General: Skin is warm and dry.   Neurological:      Mental Status: She is alert and oriented to person, place, and time.       Assessment & Plan:   1. Urinary frequency (Primary)  -     URINALYSIS NONAUTO W/O SCOPE  -     Sulfamethoxazole-Trimethoprim; Take 1 tablet by mouth 2 (two) times daily for 3 days.  Dispense: 6 tablet; Refill: 0  UA not conclusive for UTI, blood only- discussed likely due to her antibiotic use, will finish treatment course with Bactrim but if symptoms continue then follow up and assess other causes of hematuria   2. Sebaceous hyperplasia of face  -     Derm Referral - In Network  -     Tretinoin; Apply pea sized amount to the full face at night, making sure to avoid the eyes and lips. Wash off in the morning. Start using every other night and then progress to every night as tolerated. Apply moisturizer nightly to avoid excessive drying  Dispense: 45 g; Refill: 0    SRAVAN Gold, 5/1/2024, 10:46 AM

## 2024-10-17 ENCOUNTER — TELEPHONE (OUTPATIENT)
Dept: DERMATOLOGY CLINIC | Facility: CLINIC | Age: 59
End: 2024-10-17

## 2024-10-17 NOTE — TELEPHONE ENCOUNTER
Patient called    Asking if she can go into a spa before her scheduled appointment. Please call

## 2024-10-22 ENCOUNTER — TELEPHONE (OUTPATIENT)
Dept: INTERNAL MEDICINE CLINIC | Facility: CLINIC | Age: 59
End: 2024-10-22

## 2024-10-24 ENCOUNTER — OFFICE VISIT (OUTPATIENT)
Dept: DERMATOLOGY CLINIC | Facility: CLINIC | Age: 59
End: 2024-10-24

## 2024-10-24 DIAGNOSIS — L73.8 SEBACEOUS HYPERPLASIA: Primary | ICD-10-CM

## 2024-10-24 DIAGNOSIS — L81.4 LENTIGINES: ICD-10-CM

## 2024-10-24 PROCEDURE — 99213 OFFICE O/P EST LOW 20 MIN: CPT | Performed by: STUDENT IN AN ORGANIZED HEALTH CARE EDUCATION/TRAINING PROGRAM

## 2024-10-24 NOTE — PROGRESS NOTES
October 24, 2024    Established patient     Referred by: Irene HINSON  No referring provider defined for this encounter.     CHIEF COMPLAINT: lesions to face and hands    HISTORY OF PRESENT ILLNESS: .    1. Sebaceous hyperplasia  Location: face   Duration: chronic  Signs and symptoms: \"spots are coming back\"  Current treatment: tretinoin  Past treatments: cautery    2. Lesions  Location: dorsal hands  Duration: chronic  Signs and symptoms: flat and dark  Current treatment: none  Past treatment: none          DERM HISTORY:  History of skin cancer: No  History of chronic skin disease/condition: No    FAMILY HISTORY:  History of melanoma: No  History of chronic skin disease/condition: No    History/Other:    REVIEW OF SYSTEMS:  Constitutional: Denies fever, chills, unintentional weight loss.   Skin as per HPI    PAST MEDICAL HISTORY:  Past Medical History:    Bone spur    Bone spur (left foot); per NG    Hallux valgus of left foot    per NG    Multiple allergies    per NG    PVD (posterior vitreous detachment), right eye    OD; per NG    Vitreous floaters of left eye    OS; per NG       Medications  Current Outpatient Medications   Medication Sig Dispense Refill    tretinoin 0.025 % External Cream Apply 2 g topically nightly. Apply pea sized amount to the full face at night, making sure to avoid the eyes and lips. Wash off in the morning. Start using every other night and then progress to every night as tolerated. Apply moisturizer nightly to avoid excessive drying 45 g 1    levocetirizine 5 MG Oral Tab Take 1 tablet (5 mg total) by mouth every evening. (Patient not taking: Reported on 10/24/2024) 30 tablet 2       Objective:    PHYSICAL EXAM:  General: awake, alert, no acute distress  Skin: Skin exam was performed today including the following: hands and face. Pertinent findings include:   - hands with stellate brown macules  - face with yellow pink papules    ASSESSMENT & PLAN:  Pathophysiology of diagnoses  discussed with patient.  Therapeutic options reviewed. Risks, benefits, and alternatives discussed with patient. Instructions reviewed at length.    #Lentigines  - Discussed benign appearance and provided reassurance. No treatment but observation at this time. Follow-up for concerning physical changes or new symptoms.  - Recommend sun protection with spf 30 or higher, sun protective clothing such as wide brimmed hats and long sleeves. Recommend avoiding midday sun (10 am- 3 pm).     #Sebaceous hyperplasia  - Cautery, low, blunt tip at 2.2 to lesions on face  Risks (including, but not limited to scarring, pain, bleeding, infection, dyschromia) benefits, alternatives and personnel discussed with patient who consents to proceed. Vaseline applied after      Return to clinic: 3 months or sooner if something concerning arises     Silvestre Griffith MD

## 2024-11-20 ENCOUNTER — OFFICE VISIT (OUTPATIENT)
Dept: INTERNAL MEDICINE CLINIC | Facility: CLINIC | Age: 59
End: 2024-11-20

## 2024-11-20 VITALS
HEIGHT: 63 IN | BODY MASS INDEX: 34.87 KG/M2 | WEIGHT: 196.81 LBS | SYSTOLIC BLOOD PRESSURE: 138 MMHG | OXYGEN SATURATION: 95 % | HEART RATE: 65 BPM | DIASTOLIC BLOOD PRESSURE: 81 MMHG

## 2024-11-20 DIAGNOSIS — M25.511 ACUTE PAIN OF RIGHT SHOULDER: Primary | ICD-10-CM

## 2024-11-20 PROCEDURE — 99213 OFFICE O/P EST LOW 20 MIN: CPT | Performed by: INTERNAL MEDICINE

## 2024-11-20 PROCEDURE — 3079F DIAST BP 80-89 MM HG: CPT | Performed by: INTERNAL MEDICINE

## 2024-11-20 PROCEDURE — 3075F SYST BP GE 130 - 139MM HG: CPT | Performed by: INTERNAL MEDICINE

## 2024-11-20 PROCEDURE — 3008F BODY MASS INDEX DOCD: CPT | Performed by: INTERNAL MEDICINE

## 2024-11-20 RX ORDER — MELOXICAM 7.5 MG/1
7.5 TABLET ORAL DAILY
Qty: 30 TABLET | Refills: 0 | Status: SHIPPED | OUTPATIENT
Start: 2024-11-20

## 2024-11-20 NOTE — PROGRESS NOTES
Sera Walsh is a 58 year old female with complaints of:  Chief Complaint: Shoulder Pain (Right shoulder pain since 11/14 and is having a hard time sleeping)    HPI     Sera Walsh is a(n) 58 year old female with a history of allergies, PVD of R eye, who presents for shoulder pain.     Patient has been having R shoulder pain. She does not recall any inciting incident. She was doing some weight training a coupld days before. She has no numbness or tingling in her arm or hand.     Past Medical History     Past Medical History:    Bone spur    Bone spur (left foot); per NG    Hallux valgus of left foot    per NG    Multiple allergies    per NG    PVD (posterior vitreous detachment), right eye    OD; per NG    Vitreous floaters of left eye    OS; per NG        Past Surgical History     Past Surgical History:   Procedure Laterality Date    Correct bunion,simple      Other surgical history Left     bunion        Family History     Family History   Problem Relation Age of Onset    Cancer Father         Lung cancer; per NG    Diabetes Mother         per NG    Hypertension Mother         per NG    Lipids Mother         Hyperlipidemia; per NG    Lung Disorder Mother         Sarcoidosis; per NG    Asthma Son         per NG    Allergies Other         Family H/o; per NG    Glaucoma Neg     Macular degeneration Neg        Social History     Social History     Socioeconomic History    Marital status:    Tobacco Use    Smoking status: Never    Smokeless tobacco: Never   Vaping Use    Vaping status: Never Used   Substance and Sexual Activity    Alcohol use: No     Alcohol/week: 0.0 standard drinks of alcohol    Drug use: No    Sexual activity: Yes     Partners: Male   Other Topics Concern    Caffeine Concern Yes     Comment: Chocolate; per NG    Breast feeding No    Reaction to local anesthetic No    Pt has a pacemaker No    Pt has a defibrillator No       Allergies     Allergies[1]    Current Medications     Current  Outpatient Medications   Medication Sig Dispense Refill    tretinoin 0.025 % External Cream Apply 2 g topically nightly. Apply pea sized amount to the full face at night, making sure to avoid the eyes and lips. Wash off in the morning. Start using every other night and then progress to every night as tolerated. Apply moisturizer nightly to avoid excessive drying (Patient not taking: Reported on 11/20/2024) 45 g 1    levocetirizine 5 MG Oral Tab Take 1 tablet (5 mg total) by mouth every evening. 30 tablet 2     No current facility-administered medications for this visit.       Review of Systems     GENERAL HEALTH: feels well otherwise  SKIN: denies any unusual skin lesions or rashes  RESPIRATORY: denies shortness of breath with exertion  CARDIOVASCULAR: denies chest pain on exertion  GI: denies abdominal pain and denies heartburn  : denies any burning with urination, urinary frequency or urgency  NEURO: denies headaches, numbness or tingling, mental status changes  PSYCH: denies depressed mood, anxiety  MUSC: denies muscle aches, joint pain    Physical Exam     /81 (BP Location: Left arm, Patient Position: Sitting, Cuff Size: large)   Pulse 65   Ht 5' 3\" (1.6 m)   Wt 196 lb 12.8 oz (89.3 kg)   LMP 01/28/2019 (Exact Date)   SpO2 95%   BMI 34.86 kg/m²     GENERAL: well developed, well nourished,in no apparent distress  SKIN: no rashes,no suspicious lesions  HEENT: atraumatic, normocephalic,ears and throat are clear  NECK: supple,no adenopathy,no bruits  LUNGS: clear to auscultation  CARDIO: RRR without murmur  GI: good BS's,no masses, HSM or tenderness  EXTREMITIES: no cyanosis, clubbing or edema    Assessment and Plan     Diagnoses and all orders for this visit:    Acute pain of right shoulder. Likely strain or sprain. Patient's  is scheduled to undergo TKR tomorrow, so she is very concerned about having full use of her arm. Start an anti-inflammatory. Can also try Voltaren gel OTC in addition to  meloxicam. Follow up with ortho in 2 weeks if not better.   -     Meloxicam 7.5 MG Oral Tab; Take 1 tablet (7.5 mg total) by mouth daily. If pain is not adequately controlled on 7.5 mg, can take an additional 7.5 mg. Max dose 15 mg in 24 hours.  -     Ortho Referral - [facility])          Meloxicam 7.5 MG Oral Tab Take 1 tablet (7.5 mg total) by mouth daily. If pain is not adequately controlled on 7.5 mg, can take an additional 7.5 mg. Max dose 15 mg in 24 hours. 30 tablet 0    levocetirizine 5 MG Oral Tab Take 1 tablet (5 mg total) by mouth every evening. 30 tablet 2       Requested Prescriptions     Signed Prescriptions Disp Refills    Meloxicam 7.5 MG Oral Tab 30 tablet 0     Sig: Take 1 tablet (7.5 mg total) by mouth daily. If pain is not adequately controlled on 7.5 mg, can take an additional 7.5 mg. Max dose 15 mg in 24 hours.       No orders of the defined types were placed in this encounter.      No follow-ups on file.    The patient indicates understanding of these issues and agrees to the plan.    Electronically signed by Mignon Connell MD 11/20/24             [1]   Allergies  Allergen Reactions    Spironolactone      Other reaction(s): Hives

## 2024-11-26 ENCOUNTER — TELEPHONE (OUTPATIENT)
Dept: INTERNAL MEDICINE CLINIC | Facility: CLINIC | Age: 59
End: 2024-11-26

## 2024-11-26 NOTE — TELEPHONE ENCOUNTER
Patient is due for px, mammogram and pap smear. Apt scheduled for 1/22/25 - no Gyne apt mailing out letter

## 2024-12-05 ENCOUNTER — OFFICE VISIT (OUTPATIENT)
Dept: DERMATOLOGY CLINIC | Facility: CLINIC | Age: 59
End: 2024-12-05

## 2024-12-05 DIAGNOSIS — L73.8 SEBACEOUS HYPERPLASIA: Primary | ICD-10-CM

## 2024-12-05 PROCEDURE — 99213 OFFICE O/P EST LOW 20 MIN: CPT | Performed by: STUDENT IN AN ORGANIZED HEALTH CARE EDUCATION/TRAINING PROGRAM

## 2024-12-05 NOTE — PROGRESS NOTES
Established Patient    Referred by: No referring provider defined for this encounter.    CHIEF COMPLAINT: Lesion of concern     HISTORY OF PRESENT ILLNESS: Sera Walsh is a 58 year old female here for evaluation of lesion of concern.    1. Growth   Location: Face  Duration: Chronic  Signs and symptoms: Spots are still there  Current treatment: Tretinoin-not helping  Past treatments: Cautery    Personal Dermatologic History  History of skin cancer: No  History of  atypical moles: No    FAMILY HISTORY:  History of melanoma: No    Past Medical History  Past Medical History:    Bone spur    Bone spur (left foot); per NG    Hallux valgus of left foot    per NG    Multiple allergies    per NG    PVD (posterior vitreous detachment), right eye    OD; per NG    Vitreous floaters of left eye    OS; per NG       REVIEW OF SYSTEMS:  Constitutional: Denies fever, chills, unintentional weight loss.   Skin as per HPI    Medications  Current Outpatient Medications   Medication Sig Dispense Refill    Meloxicam 7.5 MG Oral Tab Take 1 tablet (7.5 mg total) by mouth daily. If pain is not adequately controlled on 7.5 mg, can take an additional 7.5 mg. Max dose 15 mg in 24 hours. 30 tablet 0    tretinoin 0.025 % External Cream Apply 2 g topically nightly. Apply pea sized amount to the full face at night, making sure to avoid the eyes and lips. Wash off in the morning. Start using every other night and then progress to every night as tolerated. Apply moisturizer nightly to avoid excessive drying (Patient not taking: Reported on 12/5/2024) 45 g 1    levocetirizine 5 MG Oral Tab Take 1 tablet (5 mg total) by mouth every evening. 30 tablet 2       PHYSICAL EXAM:  General: awake, alert, no acute distress  Neuropsych: appropriate mood and affect  Eyes: Sclerae anicteric, without conjunctival injection, eyelids unremarkable  Skin: Skin exam was performed today including the following: face. Pertinent findings include:   - with numerous  yellow pink papules    ASSESSMENT & PLAN:  Pathophysiology of diagnoses discussed with patient.  Therapeutic options reviewed. Risks, benefits, and alternatives discussed with patient. Instructions reviewed at length.    #Sebaceous hyperplasia  - Continue tretinoin 0.025% once nightly   - Will plan to repeat cautery in 1 month     Return to clinic: 1 month for cautery  or sooner if something concerning arises     Silvestre Griffith MD

## 2025-01-14 ENCOUNTER — LAB ENCOUNTER (OUTPATIENT)
Dept: LAB | Age: 60
End: 2025-01-14
Attending: INTERNAL MEDICINE
Payer: COMMERCIAL

## 2025-01-14 ENCOUNTER — OFFICE VISIT (OUTPATIENT)
Dept: INTERNAL MEDICINE CLINIC | Facility: CLINIC | Age: 60
End: 2025-01-14

## 2025-01-14 VITALS
DIASTOLIC BLOOD PRESSURE: 78 MMHG | BODY MASS INDEX: 34.8 KG/M2 | HEART RATE: 67 BPM | WEIGHT: 196.38 LBS | HEIGHT: 63 IN | SYSTOLIC BLOOD PRESSURE: 122 MMHG

## 2025-01-14 DIAGNOSIS — E55.9 VITAMIN D DEFICIENCY: ICD-10-CM

## 2025-01-14 DIAGNOSIS — Z00.00 PHYSICAL EXAM, ANNUAL: Primary | ICD-10-CM

## 2025-01-14 DIAGNOSIS — R73.03 PREDIABETES: ICD-10-CM

## 2025-01-14 DIAGNOSIS — Z23 NEED FOR VACCINATION: ICD-10-CM

## 2025-01-14 DIAGNOSIS — Z12.11 COLON CANCER SCREENING: ICD-10-CM

## 2025-01-14 DIAGNOSIS — B00.1 COLD SORE: ICD-10-CM

## 2025-01-14 DIAGNOSIS — Z00.00 PHYSICAL EXAM, ANNUAL: ICD-10-CM

## 2025-01-14 DIAGNOSIS — Z12.31 ENCOUNTER FOR SCREENING MAMMOGRAM FOR MALIGNANT NEOPLASM OF BREAST: ICD-10-CM

## 2025-01-14 LAB
ALBUMIN SERPL-MCNC: 4.5 G/DL (ref 3.2–4.8)
ALBUMIN/GLOB SERPL: 1.5 {RATIO} (ref 1–2)
ALP LIVER SERPL-CCNC: 82 U/L
ALT SERPL-CCNC: 19 U/L
ANION GAP SERPL CALC-SCNC: 6 MMOL/L (ref 0–18)
AST SERPL-CCNC: 19 U/L (ref ?–34)
BILIRUB SERPL-MCNC: 0.6 MG/DL (ref 0.2–1.1)
BUN BLD-MCNC: 16 MG/DL (ref 9–23)
BUN/CREAT SERPL: 23.2 (ref 10–20)
CALCIUM BLD-MCNC: 9.8 MG/DL (ref 8.7–10.4)
CHLORIDE SERPL-SCNC: 106 MMOL/L (ref 98–112)
CHOLEST SERPL-MCNC: 202 MG/DL (ref ?–200)
CO2 SERPL-SCNC: 30 MMOL/L (ref 21–32)
CREAT BLD-MCNC: 0.69 MG/DL
DEPRECATED RDW RBC AUTO: 48.1 FL (ref 35.1–46.3)
EGFRCR SERPLBLD CKD-EPI 2021: 99 ML/MIN/1.73M2 (ref 60–?)
ERYTHROCYTE [DISTWIDTH] IN BLOOD BY AUTOMATED COUNT: 13.3 % (ref 11–15)
EST. AVERAGE GLUCOSE BLD GHB EST-MCNC: 134 MG/DL (ref 68–126)
FASTING PATIENT LIPID ANSWER: NO
FASTING STATUS PATIENT QL REPORTED: NO
GLOBULIN PLAS-MCNC: 3 G/DL (ref 2–3.5)
GLUCOSE BLD-MCNC: 88 MG/DL (ref 70–99)
HBA1C MFR BLD: 6.3 % (ref ?–5.7)
HCT VFR BLD AUTO: 39.8 %
HDLC SERPL-MCNC: 53 MG/DL (ref 40–59)
HGB BLD-MCNC: 13.4 G/DL
LDLC SERPL CALC-MCNC: 130 MG/DL (ref ?–100)
MCH RBC QN AUTO: 33.1 PG (ref 26–34)
MCHC RBC AUTO-ENTMCNC: 33.7 G/DL (ref 31–37)
MCV RBC AUTO: 98.3 FL
NONHDLC SERPL-MCNC: 149 MG/DL (ref ?–130)
OSMOLALITY SERPL CALC.SUM OF ELEC: 295 MOSM/KG (ref 275–295)
PLATELET # BLD AUTO: 221 10(3)UL (ref 150–450)
POTASSIUM SERPL-SCNC: 4 MMOL/L (ref 3.5–5.1)
PROT SERPL-MCNC: 7.5 G/DL (ref 5.7–8.2)
RBC # BLD AUTO: 4.05 X10(6)UL
SODIUM SERPL-SCNC: 142 MMOL/L (ref 136–145)
TRIGL SERPL-MCNC: 105 MG/DL (ref 30–149)
TSI SER-ACNC: 1.37 UIU/ML (ref 0.55–4.78)
VIT D+METAB SERPL-MCNC: 12.6 NG/ML (ref 30–100)
VLDLC SERPL CALC-MCNC: 19 MG/DL (ref 0–30)
WBC # BLD AUTO: 5.5 X10(3) UL (ref 4–11)

## 2025-01-14 PROCEDURE — 3078F DIAST BP <80 MM HG: CPT | Performed by: INTERNAL MEDICINE

## 2025-01-14 PROCEDURE — 36415 COLL VENOUS BLD VENIPUNCTURE: CPT

## 2025-01-14 PROCEDURE — 83036 HEMOGLOBIN GLYCOSYLATED A1C: CPT

## 2025-01-14 PROCEDURE — 82306 VITAMIN D 25 HYDROXY: CPT

## 2025-01-14 PROCEDURE — 3008F BODY MASS INDEX DOCD: CPT | Performed by: INTERNAL MEDICINE

## 2025-01-14 PROCEDURE — 80061 LIPID PANEL: CPT

## 2025-01-14 PROCEDURE — 90471 IMMUNIZATION ADMIN: CPT | Performed by: INTERNAL MEDICINE

## 2025-01-14 PROCEDURE — 80053 COMPREHEN METABOLIC PANEL: CPT

## 2025-01-14 PROCEDURE — 85027 COMPLETE CBC AUTOMATED: CPT

## 2025-01-14 PROCEDURE — 3074F SYST BP LT 130 MM HG: CPT | Performed by: INTERNAL MEDICINE

## 2025-01-14 PROCEDURE — 84443 ASSAY THYROID STIM HORMONE: CPT

## 2025-01-14 PROCEDURE — 99396 PREV VISIT EST AGE 40-64: CPT | Performed by: INTERNAL MEDICINE

## 2025-01-14 PROCEDURE — 90715 TDAP VACCINE 7 YRS/> IM: CPT | Performed by: INTERNAL MEDICINE

## 2025-01-14 RX ORDER — VALACYCLOVIR HYDROCHLORIDE 1 G/1
2000 TABLET, FILM COATED ORAL EVERY 12 HOURS SCHEDULED
Qty: 4 TABLET | Refills: 0 | Status: SHIPPED | OUTPATIENT
Start: 2025-01-14 | End: 2025-01-15

## 2025-01-14 NOTE — PROGRESS NOTES
Sera Walsh is a 59 year old female.  Chief Complaint   Patient presents with    Physical       HPI:   Patient comes for physical  C/C physical  C/o ankle and shoulder pains are better         Lives with    Works as a       PMH  H/o right eye vitreous detachment-- [facility]-exterior vitreous detachment-  Sebaceous hyperplasia       Current Outpatient Medications   Medication Sig Dispense Refill    valACYclovir (VALTREX) 1 G Oral Tab Take 2 tablets (2,000 mg total) by mouth every 12 (twelve) hours for 1 day. 4 tablet 0    tretinoin 0.025 % External Cream Apply 2 g topically nightly. Apply pea sized amount to the full face at night, making sure to avoid the eyes and lips. Wash off in the morning. Start using every other night and then progress to every night as tolerated. Apply moisturizer nightly to avoid excessive drying 45 g 1    levocetirizine 5 MG Oral Tab Take 1 tablet (5 mg total) by mouth every evening. 30 tablet 2      Past Medical History:    Bone spur    Bone spur (left foot); per NG    Hallux valgus of left foot    per NG    Multiple allergies    per NG    PVD (posterior vitreous detachment), right eye    OD; per NG    Vitreous floaters of left eye    OS; per NG      Past Surgical History:   Procedure Laterality Date    Correct bunion,simple      Other surgical history Left     bunion      Social History:  Social History     Socioeconomic History    Marital status:    Tobacco Use    Smoking status: Never    Smokeless tobacco: Never   Vaping Use    Vaping status: Never Used   Substance and Sexual Activity    Alcohol use: No     Alcohol/week: 0.0 standard drinks of alcohol    Drug use: No    Sexual activity: Yes     Partners: Male   Other Topics Concern    Caffeine Concern Yes     Comment: Chocolate; per NG    Breast feeding No    Reaction to local anesthetic No    Pt has a pacemaker No    Pt has a defibrillator No        REVIEW OF SYSTEMS:   GENERAL HEALTH: No  fevers, chills, sweats, fatigue  VISION: No recent vision problems, blurry vision or double vision  HEENT: No decreased hearing ear pain nasal congestion or sore throat  SKIN: denies any unusual skin lesions or rashes  RESPIRATORY: denies shortness of breath, cough, wheezing  CARDIOVASCULAR: denies chest pain on exertion, palpitations, swelling in feet  GI: denies abdominal pain and denies heartburn, nausea or vomiting  : No Pain on urination, change in the color of urine, discharge, urinating frequently  MUS: No back pain, joint pain, muscle pain  NEURO: denies headaches , anxiety, depression    EXAM:   /78   Pulse 67   Ht 5' 3\" (1.6 m)   Wt 196 lb 6.4 oz (89.1 kg)   LMP 01/28/2019 (Exact Date)   BMI 34.79 kg/m²   GENERAL: well developed, well nourished,in no apparent distress  SKIN: no rashes,no suspicious lesions  HEENT: atraumatic, normocephalic,ears and throat are clear,no frontal or  maxillary sinus tenderness, pupils equal reactive to light bilaterally, extraocular muscles intact  NECK: supple,no adenopathy,nontender   LUNGS: clear to auscultation, no wheeze  CARDIO: RRR without murmur  GI: good BS's,no masses or tenderness  EXTREMITIES: no cyanosis, or edema  BREAST: Bilateral breasts without any lumps, bilateral axilla without any lymphadenopathy, no nipple retraction or  discharge      ASSESSMENT AND PLAN:   Diagnoses and all orders for this visit:    Physical exam, annual  -     Comp Metabolic Panel (14); Future  -     Hemoglobin A1C; Future  -     Lipid Panel; Future  -     TSH W Reflex To Free T4; Future  -     Vitamin D; Future  -     CBC, Platelet; No Differential; Future  Advised patient to watch what she eats exercise, seatbelt use no texting driving, sunscreen use advised  Need for vaccination  -     TETANUS, DIPHTHERIA TOXOIDS AND ACELLULAR PERTUSIS VACCINE (TDAP), >7 YEARS, IM USE  Today in office  Encounter for screening mammogram for malignant neoplasm of breast  -     [facility] JUAQUIN  2D+3D SCREENING BILAT (CPT=77067/33836); Future  Did  clinical breast exam, review self-exam and ordered mammogram  Vitamin D deficiency  -     Vitamin D; Future  Recheck  Prediabetes  -     Hemoglobin A1C; Future  Advised to follow low-carb low sugar diet and exercise with a goal of 30 minutes a day, monitor  Colon cancer screening  -     [facility] GI Telephone Colon Screen  -     Gastro Referral - In Network  Refer   Cold sore  -     valACYclovir (VALTREX) 1 G Oral Tab; Take 2 tablets (2,000 mg total) by mouth every 12 (twelve) hours for 1 day.      Has been having a cold sore for over 2 weeks, will treat            Preventive medicine  Pap smear- to be done today , previous one in 2015-normal  Colonoscopy-at the age of 49, rpt in 5 yrs -- ie 2021   Mammogram-2015, ordered   Labs to be done today as she is fasting  Declines flu shot      The patient indicates understanding of these issues and agrees to the plan.  No follow-ups on file.

## 2025-01-20 RX ORDER — ERGOCALCIFEROL 1.25 MG/1
50000 CAPSULE, LIQUID FILLED ORAL WEEKLY
Qty: 4 CAPSULE | Refills: 3 | Status: SHIPPED | OUTPATIENT
Start: 2025-01-20

## 2025-02-10 ENCOUNTER — TELEPHONE (OUTPATIENT)
Dept: INTERNAL MEDICINE CLINIC | Facility: CLINIC | Age: 60
End: 2025-02-10

## 2025-02-10 NOTE — TELEPHONE ENCOUNTER
Pt is due for mammogram apt set for 2/19/25 - also due for colonoscopy mailing out letter and referral

## 2025-02-19 ENCOUNTER — HOSPITAL ENCOUNTER (OUTPATIENT)
Dept: MAMMOGRAPHY | Age: 60
Discharge: HOME | End: 2025-02-19
Attending: INTERNAL MEDICINE
Payer: COMMERCIAL

## 2025-02-19 DIAGNOSIS — Z12.31 ENCOUNTER FOR SCREENING MAMMOGRAM FOR MALIGNANT NEOPLASM OF BREAST: ICD-10-CM

## 2025-02-19 PROCEDURE — 77067 SCR MAMMO BI INCL CAD: CPT | Performed by: INTERNAL MEDICINE

## 2025-02-19 PROCEDURE — 77063 BREAST TOMOSYNTHESIS BI: CPT | Performed by: INTERNAL MEDICINE

## 2025-02-27 ENCOUNTER — TELEPHONE (OUTPATIENT)
Dept: INTERNAL MEDICINE CLINIC | Facility: CLINIC | Age: 60
End: 2025-02-27

## 2025-02-27 NOTE — TELEPHONE ENCOUNTER
Patient called looking for ph# to mammography. She had received a call from dept.     Went over mammogram results. Patient needs additional imaging.     Patient to call [phone].   Patient verbalized understanding.

## 2025-03-07 ENCOUNTER — HOSPITAL ENCOUNTER (OUTPATIENT)
Dept: MAMMOGRAPHY | Facility: HOSPITAL | Age: 60
Discharge: HOME | End: 2025-03-07
Attending: INTERNAL MEDICINE
Payer: COMMERCIAL

## 2025-03-07 ENCOUNTER — HOSPITAL ENCOUNTER (OUTPATIENT)
Dept: ULTRASOUND IMAGING | Facility: HOSPITAL | Age: 60
Discharge: HOME | End: 2025-03-07
Attending: INTERNAL MEDICINE
Payer: COMMERCIAL

## 2025-03-07 DIAGNOSIS — R92.8 ABNORMAL MAMMOGRAM: ICD-10-CM

## 2025-03-07 DIAGNOSIS — R92.8 ABNORMAL MAMMOGRAM: Primary | ICD-10-CM

## 2025-03-07 PROCEDURE — 77066 DX MAMMO INCL CAD BI: CPT | Performed by: INTERNAL MEDICINE

## 2025-03-07 PROCEDURE — 77062 BREAST TOMOSYNTHESIS BI: CPT | Performed by: INTERNAL MEDICINE

## 2025-03-07 PROCEDURE — 76642 ULTRASOUND BREAST LIMITED: CPT | Performed by: INTERNAL MEDICINE

## 2025-03-20 NOTE — TELEPHONE ENCOUNTER
2nd attempt, left detail message, letter approved
[employer]: patient asking if work note can be extended. Still coughing; not able to sleep; she is requesting to return to work on Friday 4/7/23. Pended letter for your review.
Left message to call back please transfer to triage. A mychart message was also sent.
Letter sent to patient via [email or web address].
AMS/feeding tube replacement/other

## 2025-04-11 ENCOUNTER — OFFICE VISIT (OUTPATIENT)
Facility: CLINIC | Age: 60
End: 2025-04-11

## 2025-04-11 ENCOUNTER — TELEPHONE (OUTPATIENT)
Facility: CLINIC | Age: 60
End: 2025-04-11

## 2025-04-11 VITALS
HEIGHT: 63 IN | SYSTOLIC BLOOD PRESSURE: 109 MMHG | DIASTOLIC BLOOD PRESSURE: 74 MMHG | WEIGHT: 190.63 LBS | BODY MASS INDEX: 33.78 KG/M2 | HEART RATE: 69 BPM

## 2025-04-11 DIAGNOSIS — K63.5 POLYP OF COLON, UNSPECIFIED PART OF COLON, UNSPECIFIED TYPE: Primary | ICD-10-CM

## 2025-04-11 DIAGNOSIS — Z86.0100 HISTORY OF COLON POLYPS: Primary | ICD-10-CM

## 2025-04-11 PROCEDURE — 99203 OFFICE O/P NEW LOW 30 MIN: CPT | Performed by: INTERNAL MEDICINE

## 2025-04-11 PROCEDURE — 3008F BODY MASS INDEX DOCD: CPT | Performed by: INTERNAL MEDICINE

## 2025-04-11 PROCEDURE — 3078F DIAST BP <80 MM HG: CPT | Performed by: INTERNAL MEDICINE

## 2025-04-11 PROCEDURE — 3074F SYST BP LT 130 MM HG: CPT | Performed by: INTERNAL MEDICINE

## 2025-04-11 NOTE — TELEPHONE ENCOUNTER
Scheduled for:  Colonoscopy 459[address]  Provider Name: Dr. Douglas   Date:  5/24/2025  Location:[facility]  Sedation:  MAC  Time:  11:00 Am (Patient is aware that endo will call with arrival time )  Prep: Split dose Golytely  Prep Instructions Given At The Office Visit  Meds/Allergies Reconciled?: Physician Reviewed   Diagnosis with codes:  History of Colon Polyps Z86.0101  Was patient informed to call insurance with codes (Y/N):  Yes  Referral sent?:  Referral was sent at the time of electronic surgical scheduling.  [facility] or [facility] notified?:  I sent an electronic request to Endo Scheduling and received a confirmation today.  Medication Orders:  Pt is aware to NOT take iron pills, herbal meds and diet supplements for 7 days before exam. Also to NOT take any form of alcohol, recreational drugs and any forms of ED meds 24 hours before exam.   Misc Orders:  Patient was informed about the new cancellation policy for his/her procedure. Patient was also given a copy of the cancellation policy at the time of the appointment and verbalized understanding.     Further instructions given by staff:  I provide prep instructions to patient at the time of the appointment/Mychart and reviewed date, and location, patient verbalized that she understood and is aware to call if she has any questions.

## 2025-04-11 NOTE — PATIENT INSTRUCTIONS
1. Schedule colonoscopy with MAC or IV sedation [Diagnosis: hx colon polyps].    2.  bowel prep from pharmacy (split dose golytely). If your prescription is not available and/or is cost-prohibitive, please contact the office as soon as possible to ensure you receive a bowel prep before your procedure.     3. Continue all medications for procedure.    4. Read all bowel prep instructions carefully.    5. AVOID seeds, nuts, popcorn, and raw fruits and vegetables (cooked is okay) for 2-3 days before procedure.    6. If you start any NEW medication after your visit today, please notify us. Certain medications will need to be held before the procedure or the procedure cannot be performed.     7. You will need a ride home from your procedure since you are receiving sedation. Please ensure you will have an available ride home or the procedure cannot be performed.

## 2025-04-11 NOTE — H&P
[facility] - Gastroenterology                                                                                                  Clinic History and Physical     Chief Complaint   Patient presents with    Colonoscopy Screening       Requesting physician or provider: Ambreen Card MD    HPI:   Sera Walsh is a 59 year old female with PMHx colon polyp, hemorrhoids, who presents for colon cancer surveillance evaluation.    Pt here for CRC screening. Previous colonoscopy in 2016 as below with 1 polyp removed and internal hemorrhoids. She was recommended to repeat colonoscopy in 5 years. No current GI complaints, including change in bowel habits, change in stool caliber, rectal bleeding, melena, abd pain, dysphagia, GERD, weight loss, dyspepsia, hematemesis, nausea, or vomiting.     Family history colon cancer: none   Significant constipation issues: none      Prior Endoscopies  Last Colonoscopy 2016:   IMPRESSION:  1.  Status post polypectomy times one, rule out adenomatous versus  hyperplastic polyp.  2.  Internal hemorrhoids.     Denies adverse reaction to sedation.   Denies history of VILMA.   Denies pacemaker/defibrillator.   Denies anticoagulation use.   Denies chronic pain medication use and/or other sedating medications.   Denies tobacco use.   Denies significant EtOH use.   Denies recreational drug use.     History, Medications, Allergies, ROS:      Past Medical History[1]   Past Surgical History[2]   Family Hx: Family History[3]   Social History: Short Social Hx on File[4]     Medications (Active prior to today's visit):  Current Medications[5]    Allergies:  Allergies[6]    ROS:   CONSTITUTIONAL:  negative for fevers, rigors  EYES:  negative for diplopia   RESPIRATORY:  negative for severe shortness of breath  CARDIOVASCULAR:  negative for crushing sub-sternal chest pain  GASTROINTESTINAL:  see HPI  GENITOURINARY:  negative for dysuria or gross hematuria  SKIN:  negative for jaundice   ALLERGIC/IMMUNOLOGIC:   negative for hay fever  ENDOCRINE:  negative for cold intolerance and heat intolerance  MUSCULOSKELETAL:  negative for joint effusion/severe erythema  BEHAVIOR/PSYCH:  negative for psychotic behavior    PHYSICAL EXAM:   Blood pressure 109/74, pulse 69, height 5' 3\" (1.6 m), weight 190 lb 9.6 oz (86.5 kg), last menstrual period 01/28/2019, not currently breastfeeding.    Gen: appears comfortable and in no acute distress  HEENT: sclera appear anicteric, mucus membranes appear moist  CV: the extremities are warm and well-perfused   Lung: no increased work of breathing, no conversational dyspnea   Abd: soft, non-tender exam in all quadrants without rigidity or guarding, non-distended, no masses palpated  Skin: no jaundice, no apparent rashes   Neuro: alert and interactive, no focal neuro deficits  Psych: cooperative, normal affect     Labs/Imaging:     Patient's labs and imaging were reviewed and discussed with patient today.    .  ASSESSMENT/PLAN:   Sera Walsh is a 59 year old female with PMHx colon polyp, hemorrhoids, who presents for colon cancer surveillance evaluation.    Last colonoscopy in 2016 with 1 polyp removed, unknown pathology. She was recommended to repeat in 5 years. No family history of colon cancer and no current GI complaints. We discussed risks/benefits/alternatives to procedure, including CT colonography and stool testing; patient would like to proceed with colonoscopy.    Recommendations:  1. Schedule colonoscopy with MAC or IV sedation [Diagnosis: hx colon polyps].    2.  bowel prep from pharmacy (split dose golytely). If your prescription is not available and/or is cost-prohibitive, please contact the office as soon as possible to ensure you receive a bowel prep before your procedure.     3. Continue all medications for procedure.    4. Read all bowel prep instructions carefully.    5. AVOID seeds, nuts, popcorn, and raw fruits and vegetables (cooked is okay) for 2-3 days before  procedure.    6. If you start any NEW medication after your visit today, please notify us. Certain medications will need to be held before the procedure or the procedure cannot be performed.     7. You will need a ride home from your procedure since you are receiving sedation. Please ensure you will have an available ride home or the procedure cannot be performed.     Colonoscopy consent: I have discussed the risks, benefits, and alternatives to colonoscopy with the patient [who demonstrated understanding], including but not limited to the risks of bleeding, infection, pain, as well as the risks of anesthesia and perforation all leading to prolonged hospitalization, surgical intervention. I also specifically mentioned the miss rate of colonoscopy of 5-10% in the best of all circumstances. All questions were answered to the patient’s satisfaction. The patient elected to proceed with colonoscopy with intervention [i.e. polypectomy, etc.] as indicated.    Orders This Visit:  No orders of the defined types were placed in this encounter.      Meds This Visit:  Requested Prescriptions     Signed Prescriptions Disp Refills    polyethylene glycol, PEG 3350-KCl-NaBcb-NaCl-NaSulf, 236 g Oral Recon Soln 1 each 0     Sig: Take 4,000 mL by mouth As Directed. Take 2,000 mL the night before your procedure and 2,000 mL the morning of your procedure.       Imaging & Referrals:  None       Michelle Douglas MD  [facility]  4/11/2025           [1]   Past Medical History:   Bone spur    Bone spur (left foot); per NG    Hallux valgus of left foot    per NG    Multiple allergies    per NG    PVD (posterior vitreous detachment), right eye    OD; per NG    Vitreous floaters of left eye    OS; per NG   [2]   Past Surgical History:  Procedure Laterality Date    Colonoscopy      Correct bunion,simple      Other surgical history Left     bunion   [3]   Family History  Problem Relation Age of Onset    Diabetes Mother          per NG    Hypertension Mother         per NG    Lipids Mother         Hyperlipidemia; per NG    Lung Disorder Mother         Sarcoidosis; per NG    Cancer Father         Lung cancer; per NG    Asthma Son         per NG    Allergies Other         Family H/o; per NG    Glaucoma Neg     Macular degeneration Neg     Breast Cancer Neg     Ovarian Cancer Neg     Pancreatic Cancer Neg     Prostate Cancer Neg    [4]   Social History  Socioeconomic History    Marital status:    Tobacco Use    Smoking status: Never    Smokeless tobacco: Never   Vaping Use    Vaping status: Never Used   Substance and Sexual Activity    Alcohol use: No     Alcohol/week: 0.0 standard drinks of alcohol    Drug use: No    Sexual activity: Yes     Partners: Male   Other Topics Concern    Caffeine Concern Yes     Comment: Chocolate; per NG    Breast feeding No    Reaction to local anesthetic No    Pt has a pacemaker No    Pt has a defibrillator No   [5]   Current Outpatient Medications   Medication Sig Dispense Refill    polyethylene glycol, PEG 3350-KCl-NaBcb-NaCl-NaSulf, 236 g Oral Recon Soln Take 4,000 mL by mouth As Directed. Take 2,000 mL the night before your procedure and 2,000 mL the morning of your procedure. 1 each 0    ergocalciferol 1.25 MG (68407 UT) Oral Cap Take 1 capsule (50,000 Units total) by mouth once a week. 4 capsule 3    tretinoin 0.025 % External Cream Apply 2 g topically nightly. Apply pea sized amount to the full face at night, making sure to avoid the eyes and lips. Wash off in the morning. Start using every other night and then progress to every night as tolerated. Apply moisturizer nightly to avoid excessive drying 45 g 1    levocetirizine 5 MG Oral Tab Take 1 tablet (5 mg total) by mouth every evening. 30 tablet 2   [6]   Allergies  Allergen Reactions    Spironolactone HIVES     Other reaction(s): Hives

## 2025-05-06 ENCOUNTER — TELEMEDICINE (OUTPATIENT)
Facility: LOCATION | Age: 60
End: 2025-05-06

## 2025-05-06 ENCOUNTER — NURSE TRIAGE (OUTPATIENT)
Dept: INTERNAL MEDICINE CLINIC | Facility: CLINIC | Age: 60
End: 2025-05-06

## 2025-05-06 DIAGNOSIS — R05.1 ACUTE COUGH: ICD-10-CM

## 2025-05-06 DIAGNOSIS — J01.90 ACUTE BACTERIAL SINUSITIS: Primary | ICD-10-CM

## 2025-05-06 DIAGNOSIS — B96.89 ACUTE BACTERIAL SINUSITIS: Primary | ICD-10-CM

## 2025-05-06 PROCEDURE — 99213 OFFICE O/P EST LOW 20 MIN: CPT | Performed by: STUDENT IN AN ORGANIZED HEALTH CARE EDUCATION/TRAINING PROGRAM

## 2025-05-06 RX ORDER — FLUTICASONE PROPIONATE 50 MCG
2 SPRAY, SUSPENSION (ML) NASAL DAILY
Qty: 1 EACH | Refills: 11 | Status: SHIPPED | OUTPATIENT
Start: 2025-05-06

## 2025-05-06 RX ORDER — AZELASTINE HYDROCHLORIDE 137 UG/1
1-2 SPRAY, METERED NASAL 2 TIMES DAILY
Qty: 30 ML | Refills: 11 | Status: SHIPPED | OUTPATIENT
Start: 2025-05-06

## 2025-05-06 RX ORDER — METHYLPREDNISOLONE 4 MG/1
TABLET ORAL
Qty: 1 EACH | Refills: 0 | Status: SHIPPED | OUTPATIENT
Start: 2025-05-06

## 2025-05-06 RX ORDER — CODEINE PHOSPHATE AND GUAIFENESIN 10; 100 MG/5ML; MG/5ML
10 SOLUTION ORAL EVERY 6 HOURS PRN
Qty: 118 ML | Refills: 0 | Status: SHIPPED | OUTPATIENT
Start: 2025-05-06 | End: 2025-05-13
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
## 2025-05-06 NOTE — TELEPHONE ENCOUNTER
Action Requested: Summary for Provider     []  Critical Lab, Recommendations Needed  [] Need Additional Advice  []   FYI    []   Need Orders  [] Need Medications Sent to Pharmacy  []  Other     SUMMARY: Patient scheduled for a video visit today with Dr. Del Rio at 2pm.    Patient states she started having body aches , a dry cough, post nasal drip and pain under her eyes and on forehead on Friday. Patient has been taking over the counter cough and cold medications without relief. Patent states coughing is keeping her up at night and her ribs hurt from coughing. Patient asking for medication to be prescribed. Patient advised a video visit and agrees.     Reason for call: Cough  Onset: 5 days ago  Reason for Disposition   Patient wants to be seen    Protocols used: Cough-A-OH

## 2025-05-24 ENCOUNTER — HOSPITAL ENCOUNTER (OUTPATIENT)
Facility: HOSPITAL | Age: 60
Setting detail: HOSPITAL OUTPATIENT SURGERY
Discharge: HOME | End: 2025-05-24
Attending: INTERNAL MEDICINE | Admitting: INTERNAL MEDICINE
Payer: COMMERCIAL

## 2025-05-24 ENCOUNTER — ANESTHESIA EVENT (OUTPATIENT)
Dept: ENDOSCOPY | Facility: HOSPITAL | Age: 60
End: 2025-05-24
Payer: COMMERCIAL

## 2025-05-24 ENCOUNTER — ANESTHESIA (OUTPATIENT)
Dept: ENDOSCOPY | Facility: HOSPITAL | Age: 60
End: 2025-05-24
Payer: COMMERCIAL

## 2025-05-24 VITALS
DIASTOLIC BLOOD PRESSURE: 70 MMHG | SYSTOLIC BLOOD PRESSURE: 115 MMHG | WEIGHT: 190 LBS | HEIGHT: 63 IN | HEART RATE: 61 BPM | RESPIRATION RATE: 21 BRPM | OXYGEN SATURATION: 98 % | BODY MASS INDEX: 33.66 KG/M2

## 2025-05-24 DIAGNOSIS — Z86.0100 HISTORY OF COLON POLYPS: ICD-10-CM

## 2025-05-24 PROBLEM — K64.8 INTERNAL HEMORRHOIDS: Status: ACTIVE | Noted: 2025-05-24

## 2025-05-24 PROBLEM — K63.5 POLYP OF COLON: Status: ACTIVE | Noted: 2025-05-24

## 2025-05-24 PROBLEM — K57.90 DIVERTICULOSIS: Status: ACTIVE | Noted: 2025-05-24

## 2025-05-24 PROCEDURE — 45385 COLONOSCOPY W/LESION REMOVAL: CPT | Performed by: INTERNAL MEDICINE

## 2025-05-24 DEVICE — REPLAY HEMOSTASIS CLIP, 11MM SPAN
Type: IMPLANTABLE DEVICE | Status: FUNCTIONAL
Brand: REPLAY

## 2025-05-24 RX ORDER — SODIUM CHLORIDE, SODIUM LACTATE, POTASSIUM CHLORIDE, CALCIUM CHLORIDE 600; 310; 30; 20 MG/100ML; MG/100ML; MG/100ML; MG/100ML
INJECTION, SOLUTION INTRAVENOUS CONTINUOUS
Status: DISCONTINUED | OUTPATIENT
Start: 2025-05-24 | End: 2025-05-24

## 2025-05-24 RX ORDER — LIDOCAINE HYDROCHLORIDE 10 MG/ML
INJECTION, SOLUTION EPIDURAL; INFILTRATION; INTRACAUDAL; PERINEURAL AS NEEDED
Status: DISCONTINUED | OUTPATIENT
Start: 2025-05-24 | End: 2025-05-24 | Stop reason: SURG

## 2025-05-24 RX ORDER — NALOXONE HYDROCHLORIDE 0.4 MG/ML
0.08 INJECTION, SOLUTION INTRAMUSCULAR; INTRAVENOUS; SUBCUTANEOUS ONCE AS NEEDED
Status: DISCONTINUED | OUTPATIENT
Start: 2025-05-24 | End: 2025-05-24

## 2025-05-24 RX ADMIN — LIDOCAINE HYDROCHLORIDE 50 MG: 10 INJECTION, SOLUTION EPIDURAL; INFILTRATION; INTRACAUDAL; PERINEURAL at 09:28:00

## 2025-05-24 RX ADMIN — SODIUM CHLORIDE, SODIUM LACTATE, POTASSIUM CHLORIDE, CALCIUM CHLORIDE: 600; 310; 30; 20 INJECTION, SOLUTION INTRAVENOUS at 09:46:00

## 2025-05-24 RX ADMIN — SODIUM CHLORIDE, SODIUM LACTATE, POTASSIUM CHLORIDE, CALCIUM CHLORIDE: 600; 310; 30; 20 INJECTION, SOLUTION INTRAVENOUS at 09:26:00

## 2025-05-24 NOTE — H&P
History & Physical Examination    Patient Name: Sera Walsh  MRN: [id number]  [facility]: [id number]  YOB: 1965    Diagnosis: history of colon polyps, surveillance colonoscopy    Prescriptions Prior to Admission[1]  Current Hospital Medications[2]    Allergies: Allergies[3]    Past Medical History[4]  Past Surgical History[5]  Family History[6]  Social History     Tobacco Use    Smoking status: Never    Smokeless tobacco: Never   Substance Use Topics    Alcohol use: No     Alcohol/week: 0.0 standard drinks of alcohol       SYSTEM Check if Review is Normal Check if Physical Exam is Normal If not normal, please explain:   HEENT [X ] [ X]    NECK  [X ] [ X]    HEART [X ] [ X]    LUNGS [X ] [ X]    ABDOMEN [X ] [ X]    EXTREMITIES [X ] [ X]    OTHER        I have discussed the risks and benefits and alternatives of the procedure with the patient/family.  They understand and agree to proceed with plan of care.   I have reviewed the History and Physical done within the last 30 days.  Any changes noted above.    Michelle Douglas MD  [facility]         [1]   Medications Prior to Admission   Medication Sig Dispense Refill Last Dose/Taking    azelastine 137 MCG/SPRAY Nasal Solution 1-2 sprays by Nasal route in the morning and 1-2 sprays before bedtime. FOR SINUS SYMPTOMS/NASAL CONGESTION. 30 mL 11 Taking    fluticasone propionate 50 MCG/ACT Nasal Suspension 2 sprays by Each Nare route daily. FOR NASAL CONGESTION/SINUS SYMPTOMS. 1 each 11 Taking    ergocalciferol 1.25 MG (62794 UT) Oral Cap Take 1 capsule (50,000 Units total) by mouth once a week. 4 capsule 3 Taking    tretinoin 0.025 % External Cream Apply 2 g topically nightly. Apply pea sized amount to the full face at night, making sure to avoid the eyes and lips. Wash off in the morning. Start using every other night and then progress to every night as tolerated. Apply moisturizer nightly to avoid excessive drying 45 g 1 Taking     levocetirizine 5 MG Oral Tab Take 1 tablet (5 mg total) by mouth every evening. 30 tablet 2 Taking    polyethylene glycol, PEG 3350-KCl-NaBcb-NaCl-NaSulf, 236 g Oral Recon Soln Take 4,000 mL by mouth As Directed. Take 2,000 mL the night before your procedure and 2,000 mL the morning of your procedure. 1 each 0    [2]   No current facility-administered medications for this encounter.   [3]   Allergies  Allergen Reactions    Spironolactone HIVES     Other reaction(s): Hives   [4]   Past Medical History:   Bone spur    Bone spur (left foot); per NG    Hallux valgus of left foot    per NG    Multiple allergies    per NG    PVD (posterior vitreous detachment), right eye    OD; per NG    Visual impairment    Vitreous floaters of left eye    OS; per NG   [5]   Past Surgical History:  Procedure Laterality Date    Colonoscopy      Correct bunion,simple      Other surgical history Left     bunion   [6]   Family History  Problem Relation Age of Onset    Diabetes Mother         per NG    Hypertension Mother         per NG    Lipids Mother         Hyperlipidemia; per NG    Lung Disorder Mother         Sarcoidosis; per NG    Cancer Father         Lung cancer; per NG    Asthma Son         per NG    Allergies Other         Family H/o; per NG    Glaucoma Neg     Macular degeneration Neg     Breast Cancer Neg     Ovarian Cancer Neg     Pancreatic Cancer Neg     Prostate Cancer Neg

## 2025-05-24 NOTE — DISCHARGE INSTRUCTIONS
Home Care Instructions for Colonoscopy with Sedation    Diet:  - Resume your regular diet as tolerated unless otherwise instructed.  - Start with light meals to minimize bloating.  - Do not drink alcohol today.    Medication:  - If you have questions about resuming your normal medications, please contact your Primary Care Physician.    Activities:  - Take it easy today. Do not return to work today.  - Do not drive today.  - Do not operate any machinery today (including kitchen equipment).    Colonoscopy:  - You may notice some rectal \"spotting\" (a little blood on the toilet tissue) for a day or two after the exam. This is normal.  - If you experience any rectal bleeding (not spotting), persistent tenderness or sharp severe abdominal pains, oral temperature over 100 degrees Fahrenheit, light-headedness or dizziness, or any other problems, contact your doctor.    **If unable to reach your doctor, please go to the [facility] Emergency Room**    - Your referring physician will receive a full report of your examination.  - If you do not hear from your doctor's office within two weeks of your biopsy, please call them for your results.    You may be able to see your laboratory results in [employer] between 4 and 7 business days.  In some cases, your physician may not have viewed the results before they are released to [employer].  If you have questions regarding your results contact the physician who ordered the test/exam by phone or via [employer] by choosing \"Ask a Medical Question.\"

## 2025-05-24 NOTE — OPERATIVE REPORT
COLONOSCOPY REPORT    Sera Walsh     1965 Age 59 year old   PCP Ambreen Card MD Endoscopist Michelle Douglas MD       Date of procedure: 25    Procedure: Colonoscopy w/ cold and hot snare polypectomy, clip placement x1    Pre-operative diagnosis: hx colon polyps    Post-operative diagnosis: colon polyps, diverticulosis, hemorrhoids     Medications: MAC    Withdrawal time: 14 minutes    Procedure:  Informed consent was obtained from the patient after the risks of the procedure were discussed, including but not limited to bleeding, perforation, aspiration, infection, or possibility of a missed lesion. After discussions of the risks/benefits and alternatives to this procedure, as well as the planned sedation, the patient was placed in the left lateral decubitus position and begun on continuous blood pressure pulse oximetry and EKG monitoring and this was maintained throughout the procedure. Once an adequate level of sedation was obtained a digital rectal exam was completed. Then the lubricated tip of the [id number] diagnostic video colonoscope was inserted and advanced without difficulty to the cecum using water immersion and CO2 insufflation technique. The cecum was identified by localizing the trifold, the appendix and the ileocecal valve. Withdrawal was begun with thorough washing and careful examination of the colonic walls and folds. A routine second examination of the cecum/ascending colon was performed. Photodocumentation was obtained. The bowel prep was excellent. Views of the colon were excellent with washing. I then carefully withdrew the instrument from the patient who tolerated the procedure well.     Complications: none.    Findings:   1. Two polyps noted as follows:      A. 12-15 mm polyp in the ascending colon; semi-pedunculated morphology; hot snare polypectomy performed, polyp retrieved. One clip placed for hemostasis. The polyp was transected to remove via suction.        B. 4 mm polyp in the ascending colon; sessile morphology; cold snare polypectomy performed, polyp retrieved.    2. Diverticulosis: sigmoid colon.    3. Ileocecal valve appeared healthy and normal. The examined portion of the terminal ileum appeared normal.    4. The colonic mucosa throughout the colon showed normal vascular pattern, without evidence of angioectasias or inflammation.     5. A retroflexed view of the rectum revealed small internal hemorrhoids.    6. DEION: normal rectal tone, no masses palpated.     Impression:   Two polyps removed. The larger polyp was 1-1.5 cm in size. One clip placed.   Sigmoid diverticulosis.   Small internal hemorrhoids.   Normal terminal ileum.   The colon was otherwise normal with glistening mucosa and intact vascular pattern.    Recommend:  Await pathology. The interval for the next colonoscopy will be determined after reviewing pathology. If new signs or symptoms develop, colonoscopy may need to be repeated sooner.   High fiber diet.  Monitor for blood in the stool. If having more than just tinge of blood, call office or go to the ER.  Avoid NSAIDs (motrin, ibuprofen, aleve, advil, naproxen, midol, naprosyn, excedrin) for 14 days.     >>>If tissue was obtained and you have not received your pathology results either by phone or letter within 2 weeks, please call our office at [phone].    Specimens: colon polyps     Blood loss: <1 ml
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
## 2025-05-24 NOTE — ANESTHESIA POSTPROCEDURE EVALUATION
Patient: Sera Walsh    Procedure Summary       Date: 05/24/25 Room / Location: [facility] ENDOSCOPY 01 / EM ENDOSCOPY    Anesthesia Start: 0926 Anesthesia Stop: 0951    Procedure: COLONOSCOPY Diagnosis:       History of colon polyps      (diverticulosis, hemorrhoids, polyps,)    Surgeons: Michelle Douglas MD Anesthesiologist: Juan Ortega DO    Anesthesia Type: MAC ASA Status: 1            Anesthesia Type: MAC    Vitals Value Taken Time   /55 05/24/25 09:51   Temp  05/24/25 09:54   Pulse 69 05/24/25 09:51   Resp 18 05/24/25 09:51   SpO2 97 % 05/24/25 09:51       [facility] AN Post Evaluation:   Patient Evaluated in PACU  Patient Participation: complete - patient participated  Level of Consciousness: awake  Pain Management: adequate  Airway Patency:patent  Dental exam unchanged from preop  Yes    Nausea/Vomiting: none  Cardiovascular Status: acceptable  Respiratory Status: acceptable and room air  Postoperative Hydration acceptable      JUAN ORTEGA DO  5/24/2025 9:54 AM

## 2025-05-30 ENCOUNTER — TELEPHONE (OUTPATIENT)
Facility: CLINIC | Age: 60
End: 2025-05-30

## 2025-05-30 NOTE — TELEPHONE ENCOUNTER
Recall colonoscopy in 1 year per Dr. Douglas.     Last done 5/24/2025.     Recall entered into patient outreach for  5/24/2026.     Health maintenance updated.

## 2025-05-30 NOTE — TELEPHONE ENCOUNTER
----- Message from Michelle Douglas sent at 5/27/2025  4:01 PM CDT -----  Reviewed with pt via telephone.   GI staff: please recall in 1 year for colonoscopy. Thanks.   ----- Message -----  From: Lab, Background User  Sent: 5/27/2025  11:02 AM CDT  To: Michelle Douglas MD

## (undated) DEVICE — KIT ENDO ORCAPOD 160/180/190

## (undated) DEVICE — PATIENT RETURN ELECTRODE, SINGLE-USE, CONTACT QUALITY MONITORING, ADULT, WITH 9FT CORD, FOR PATIENTS WEIGING OVER 33LBS. (15KG): Brand: MEGADYNE

## (undated) DEVICE — V2 SPECIMEN COLLECTION MANIFOLD KIT: Brand: NEPTUNE

## (undated) DEVICE — 60 ML SYRINGE REGULAR TIP: Brand: MONOJECT

## (undated) DEVICE — LASSO POLYPECTOMY SNARE: Brand: LASSO

## (undated) DEVICE — KIT CLEAN ENDOKIT 1.1OZ GOWNX2

## (undated) DEVICE — Device

## (undated) NOTE — LETTER
4/3/2023      To Whom It May Concern:    Laurie Kay is currently under my medical care and may not return to work at this time. Please excuse Janacorine Bradley for 4 days. She may return to work on 4/7/23. Activity is restricted as follows: none. If you require additional information please contact our office.       Sincerely,        Blanca Samuel, SRAVAN  [phone]      Document generated by:  Maria Luz Carranza RN

## (undated) NOTE — LETTER
10/04/21        Mahesh Rutledge  [address] [facility] [place] [address]      Dear Marianne Palmer records indicate that you have outstanding lab work and or testing that was ordered for you and has not yet been completed:  Orders Placed This Encounter      M

## (undated) NOTE — LETTER
11/26/24        Sera Serranorga  [address]  [facility] [address]      Pinky Zamora,    This is the [facility], office of Dr. Ambreen Card    Thank you for putting your trust in [facility].  Our goal is to deliver the highest quality healthcare and an exceptional patient experience. Upon reviewing of your medical record shows you are due for the following:       Mammogram   Pap Smear   Colonoscopy      Please call [phone] to schedule your appointment or schedule online via [employer].     If you changed to a new provider at another facility, please notify the clinic to update your records.    If you had any recent testing at another facility, please have your results faxed to our office at [phone].     Thank you and have a great day!

## (undated) NOTE — LETTER
12/29/2020    Lovenia Pro        [address] [place] [address]            Dear Jaden Juárez,      Our records indicate that you are due for an appointment for a Colonoscopy in January 2021, or shortly there after, with a physician at [facility]

## (undated) NOTE — LETTER
[facility]  Administration of Anesthesia  Sera ALCANTAR agree to be cared for by a physician anesthesiologist alone and/or with a nurse anesthetist, who is specially trained to monitor me and give me medicine to put me to sleep or keep me comfortable during my procedure    I understand that my anesthesiologist and/or anesthetist is not an employee or agent of [facility] or [employer]. He or she works for [facility] [facility]    As the patient asking for anesthesia services, I agree to:  Allow the anesthesiologist (anesthesia doctor) to give me medicine and do additional procedures as necessary. Some examples are: Starting or using an “IV” to give me medicine, fluids or blood during my procedure, and having a breathing tube placed to help me breathe when I’m asleep (intubation). In the event that my heart stops working properly, I understand that my anesthesiologist will make every effort to sustain my life, unless otherwise directed by [facility] Do Not Resuscitate documents.  Tell my anesthesia doctor before my procedure:  If I am pregnant.  The last time that I ate or drank.  iii. All of the medicines I take (including prescriptions, herbal supplements, and pills I can buy without a prescription (including street drugs/illegal medications). Failure to inform my anesthesiologist about these medicines may increase my risk of anesthetic complications.  iv.If I am allergic to anything or have had a reaction to anesthesia before.  I understand how the anesthesia medicine will help me (benefits).  I understand that with any type of anesthesia medicine there are risks:  The most common risks are: nausea, vomiting, sore throat, muscle soreness, damage to my eyes, mouth, or teeth (from breathing tube placement).  Rare risks include: remembering what happened during my procedure, allergic reactions to medications, injury to my airway, heart, lungs, vision, nerves, or  muscles and in extremely rare instances death.  My doctor has explained to me other choices available to me for my care (alternatives).  Pregnant Patients (“epidural”):  I understand that the risks of having an epidural (medicine given into my back to help control pain during labor), include itching, low blood pressure, difficulty urinating, headache or slowing of the baby’s heart. Very rare risks include infection, bleeding, seizure, irregular heart rhythms and nerve injury.  Regional Anesthesia (“spinal”, “epidural”, & “nerve blocks”):  I understand that rare but potential complications include headache, bleeding, infection, seizure, irregular heart rhythms, and nerve injury.    _____________________________________________________________________________  Patient (or Representative) Signature/Relationship to Patient  Date   Time    _____________________________________________________________________________   Name (if used)    Language/Organization   Time    _____________________________________________________________________________  Nurse Anesthetist Signature     Date   Time  _____________________________________________________________________________  Anesthesiologist Signature     Date   Time  I have discussed the procedure and information above with the patient (or patient’s representative) and answered their questions. The patient or their representative has agreed to have anesthesia services.    _____________________________________________________________________________  Witness        Date   Time  I have verified that the signature is that of the patient or patient’s representative, and that it was signed before the procedure  Patient Name: Sera Walsh     : 1965                 Printed: 2025 at 9:34 AM    Medical Record #: [id number]                                            Page 1 of 1  ----------ANESTHESIA CONSENT----------

## (undated) NOTE — LETTER
March 17, 2020     Vladimir Smoke  [address] [place] [address]      Dear Robert Schroeder:    Below are the results from your recent visit:    Robert Schroeder, your most recent pap smear was normal and negative for any abnormalities.  We recommend repeating a pap If you have any questions or concerns, please don't hesitate to call.     [address] [place],Third Floor Physician Assistant to Dr. Rogelio Lopez

## (undated) NOTE — LETTER
2/10/2025    This is the [facility], office of Dr. Ambreen Card    Thank you for putting your trust in [facility].  Our goal is to deliver the highest quality healthcare and an exceptional patient experience. Upon reviewing of your medical record shows you are due for the following:       Colonoscopy      Please call [phone] to schedule your appointment or schedule online via [employer].     If you changed to a new provider at another facility, please notify the clinic to update your records.    If you had any recent testing at another facility, please have your results faxed to our office at [phone].     Thank you and have a great day!   If you require additional information please contact our office.      Sincerely,    Ambreen Card MD  [address] [place] [id number]  Ph: [phone]  Fax: [phone]       Document generated by:  Edy GROSS CMA

## (undated) NOTE — LETTER
[place] [address], [place], [place]    Authorization for Surgical Operation and Procedure                               I hereby authorize Michelle Douglas MD, my physician and his/her assistants (if applicable), which may include medical students, residents, and/or fellows, to perform the following surgical operation/ procedure and administer such anesthesia as may be determined necessary by my physician: Operation/Procedure name (s) COLONOSCOPY on Sera Walsh   2.   I recognize that during the surgical operation/procedure, unforeseen conditions may necessitate additional or different procedures than those listed above.  I, therefore, further authorize and request that the above-named surgeon, assistants, or designees perform such procedures as are, in their judgment, necessary and desirable.    3.   My surgeon/physician has discussed prior to my surgery the potential benefits, risks and side effects of this procedure; the likelihood of achieving goals; and potential problems that might occur during recuperation.  They also discussed reasonable alternatives to the procedure, including risks, benefits, and side effects related to the alternatives and risks related to not receiving this procedure.  I have had all my questions answered and I acknowledge that no guarantee has been made as to the result that may be obtained.    4.   Should the need arise during my operation/procedure, which includes change of level of care prior to discharge, I also consent to the administration of blood and/or blood products.  Further, I understand that despite careful testing and screening of blood or blood products by collecting agencies, I may still be subject to ill effects as a result of receiving a blood transfusion and/or blood products.  The following are some, but not all, of the potential risks that can occur: fever and allergic reactions, hemolytic reactions, transmission of  diseases such as Hepatitis, AIDS and Cytomegalovirus (CMV) and fluid overload.  In the event that I wish to have an autologous transfusion of my own blood, or a directed donor transfusion, I will discuss this with my physician.  Check only if Refusing Blood or Blood Products  I understand refusal of blood or blood products as deemed necessary by my physician may have serious consequences to my condition to include possible death. I hereby assume responsibility for my refusal and release the hospital, its personnel, and my physicians from any responsibility for the consequences of my refusal.    o  Refuse   5.   I authorize the use of any specimen, organs, tissues, body parts or foreign objects that may be removed from my body during the operation/procedure for diagnosis, research or teaching purposes and their subsequent disposal by hospital authorities.  I also authorize the release of specimen test results and/or written reports to my treating physician on the hospital medical staff or other referring or consulting physicians involved in my care, at the discretion of the Pathologist or my treating physician.    6.   I consent to the photographing or videotaping of the operations or procedures to be performed, including appropriate portions of my body for medical, scientific, or educational purposes, provided my identity is not revealed by the pictures or by descriptive texts accompanying them.  If the procedure has been photographed/videotaped, the surgeon will obtain the original picture, image, videotape or CD.  The hospital will not be responsible for storage, release or maintenance of the picture, image, tape or CD.    7.   I consent to the presence of a  or observers in the operating room as deemed necessary by my physician or their designees.    8.   I recognize that in the event my procedure results in extended X-Ray/fluoroscopy time, I may develop a skin reaction.    9. If I have a Do Not  Attempt Resuscitation (DNAR) order in place, that status will be suspended while in the operating room, procedural suite, and during the recovery period unless otherwise explicitly stated by me (or a person authorized to consent on my behalf). The surgeon or my attending physician will determine when the applicable recovery period ends for purposes of reinstating the DNAR order.  10. Patients having a sterilization procedure: I understand that if the procedure is successful the results will be permanent and it will therefore be impossible for me to inseminate, conceive, or bear children.  I also understand that the procedure is intended to result in sterility, although the result has not been guaranteed.   11. I acknowledge that my physician has explained sedation/analgesia administration to me including the risk and benefits I consent to the administration of sedation/analgesia as may be necessary or desirable in the judgment of my physician.    I CERTIFY THAT I HAVE READ AND FULLY UNDERSTAND THE ABOVE CONSENT TO OPERATION and/or OTHER PROCEDURE.     ____________________________________  _________________________________        ______________________________  Signature of Patient    Signature of Responsible Person                Printed Name of Responsible Person                                      ____________________________________  _____________________________                ________________________________  Signature of Witness        Date  Time         Relationship to Patient    STATEMENT OF PHYSICIAN My signature below affirms that prior to the time of the procedure; I have explained to the patient and/or his/her legal representative, the risks and benefits involved in the proposed treatment and any reasonable alternative to the proposed treatment. I have also explained the risks and benefits involved in refusal of the proposed treatment and alternatives to the proposed treatment and have answered the  patient's questions. If I have a significant financial interest in a co-management agreement or a significant financial interest in any product or implant, or other significant relationship used in this procedure/surgery, I have disclosed this and had a discussion with my patient.     _____________________________________________________              _____________________________  (Signature of Physician)                                                                                         (Date)                                   (Time)  Patient Name: Sera Walsh      : 1965      Printed: 2025     Medical Record #: [id number]                                      Page 1 of 1

## (undated) NOTE — LETTER
4/3/2023          To Whom It May Concern:    Bridger Maddox is currently under my medical care and may not return to work at this time. Please excuse Georgie Felipe for 2 days. She may return to work on 4/5/2023. Activity is restricted as follows: none. If you require additional information please contact our office.         Sincerely,        SRAVAN Garcia          Document generated by:  SRAVAN Garcia